# Patient Record
Sex: MALE | Race: WHITE | NOT HISPANIC OR LATINO | URBAN - METROPOLITAN AREA
[De-identification: names, ages, dates, MRNs, and addresses within clinical notes are randomized per-mention and may not be internally consistent; named-entity substitution may affect disease eponyms.]

---

## 2017-01-03 ENCOUNTER — OUTPATIENT (OUTPATIENT)
Dept: OUTPATIENT SERVICES | Facility: HOSPITAL | Age: 77
LOS: 1 days | End: 2017-01-03

## 2017-01-05 ENCOUNTER — APPOINTMENT (OUTPATIENT)
Dept: UROLOGY | Facility: CLINIC | Age: 77
End: 2017-01-05

## 2017-01-05 VITALS
BODY MASS INDEX: 42.66 KG/M2 | HEART RATE: 72 BPM | TEMPERATURE: 98.1 F | HEIGHT: 72 IN | DIASTOLIC BLOOD PRESSURE: 70 MMHG | RESPIRATION RATE: 12 BRPM | SYSTOLIC BLOOD PRESSURE: 130 MMHG | WEIGHT: 315 LBS

## 2017-01-05 DIAGNOSIS — C67.9 MALIGNANT NEOPLASM OF BLADDER, UNSPECIFIED: ICD-10-CM

## 2017-04-25 ENCOUNTER — OUTPATIENT (OUTPATIENT)
Dept: OUTPATIENT SERVICES | Facility: HOSPITAL | Age: 77
LOS: 1 days | End: 2017-04-25

## 2017-07-26 ENCOUNTER — OUTPATIENT (OUTPATIENT)
Dept: OUTPATIENT SERVICES | Facility: HOSPITAL | Age: 77
LOS: 1 days | End: 2017-07-26

## 2017-11-20 ENCOUNTER — OUTPATIENT (OUTPATIENT)
Dept: OUTPATIENT SERVICES | Facility: HOSPITAL | Age: 77
LOS: 1 days | End: 2017-11-20

## 2018-03-12 ENCOUNTER — OUTPATIENT (OUTPATIENT)
Dept: OUTPATIENT SERVICES | Facility: HOSPITAL | Age: 78
LOS: 1 days | End: 2018-03-12

## 2018-07-10 ENCOUNTER — OUTPATIENT (OUTPATIENT)
Dept: OUTPATIENT SERVICES | Facility: HOSPITAL | Age: 78
LOS: 1 days | End: 2018-07-10

## 2018-07-23 ENCOUNTER — OUTPATIENT (OUTPATIENT)
Dept: OUTPATIENT SERVICES | Facility: HOSPITAL | Age: 78
LOS: 1 days | End: 2018-07-23

## 2018-07-25 ENCOUNTER — OUTPATIENT (OUTPATIENT)
Dept: OUTPATIENT SERVICES | Facility: HOSPITAL | Age: 78
LOS: 1 days | End: 2018-07-25

## 2018-10-26 ENCOUNTER — OUTPATIENT (OUTPATIENT)
Dept: OUTPATIENT SERVICES | Facility: HOSPITAL | Age: 78
LOS: 1 days | End: 2018-10-26

## 2019-02-20 ENCOUNTER — OUTPATIENT (OUTPATIENT)
Dept: OUTPATIENT SERVICES | Facility: HOSPITAL | Age: 79
LOS: 1 days | End: 2019-02-20

## 2019-11-22 ENCOUNTER — OUTPATIENT (OUTPATIENT)
Dept: OUTPATIENT SERVICES | Facility: HOSPITAL | Age: 79
LOS: 1 days | End: 2019-11-22

## 2020-09-17 ENCOUNTER — APPOINTMENT (OUTPATIENT)
Dept: CT IMAGING | Facility: CLINIC | Age: 80
End: 2020-09-17
Payer: MEDICARE

## 2020-09-17 PROCEDURE — 74178 CT ABD&PLV WO CNTR FLWD CNTR: CPT

## 2021-04-12 ENCOUNTER — OUTPATIENT (OUTPATIENT)
Dept: OUTPATIENT SERVICES | Facility: HOSPITAL | Age: 81
LOS: 1 days | End: 2021-04-12
Payer: MEDICARE

## 2021-04-12 PROCEDURE — 74178 CT ABD&PLV WO CNTR FLWD CNTR: CPT | Mod: 26

## 2021-04-12 PROCEDURE — 78306 BONE IMAGING WHOLE BODY: CPT | Mod: 26

## 2021-05-19 ENCOUNTER — APPOINTMENT (OUTPATIENT)
Dept: RADIOLOGY | Facility: CLINIC | Age: 81
End: 2021-05-19
Payer: MEDICARE

## 2021-05-19 PROCEDURE — 71046 X-RAY EXAM CHEST 2 VIEWS: CPT

## 2021-06-03 NOTE — H&P PST ADULT - ASSESSMENT
Assessment:     ASA:   Mall:   ABR:     Problem List:   1. Unstable angina  ·	LHC and possible intervention. Consent obtained.  ·	Will start DAPT if PCI performed.  ·	IV: NS KVO  ·	Aspirin if not taken today.    2. Chronic AF  ·	Last dose of Eliquis 5/31/2021    3. DM  ·	No glimepiride or empagliflozin on day of procedure.    Discharge Planning:   ·	Discharge today if no PCI performed.  ·	Discharge in AM if PCI performed. Assessment:     ASA: 3  Mall: 3  ABR: 1.4%    Problem List:   1. Unstable angina  ·	LHC and possible intervention. Consent obtained.  ·	Will start DAPT if PCI performed.  ·	IV: NS KVO  ·	Aspirin if not taken today.    2. Chronic AF  ·	Last dose of Eliquis 5/31/2021    3. DM  ·	No glimepiride or empagliflozin on day of procedure.    Discharge Planning:   ·	Discharge today if no PCI performed.  ·	Discharge in AM if PCI performed.

## 2021-06-03 NOTE — H&P PST ADULT - HISTORY OF PRESENT ILLNESS
80y/o male former smoker with history of nonoperable CAD, S/P 2V CABG, prior PCI with stents, pacemaker, DM, HTN, HLD, chronic AF (CHADS2-Vasc of 5 for age, DM, HTN, and vascular disease),and prostate cancer who has been c/o worsening FRIEND. His diuretics were increased with no improvement. He was started on isosorbide dinitrate also with no improvement. He is unable to do a treadmill stress test and body habitus would make a false positive result more likely.    Symptoms:        Angina (Class):        Ischemic Symptoms: FRIEND    Heart Failure:        Systolic/Diastolic/Combined:        NYHA Class (within 2 weeks):     Assessment of LVEF:       EF:        Assessed by:        Date:     Prior Cardiac Interventions:       PCI's:        CABG:     Noninvasive Testing:   Stress Test: N/A    Echo: 5/7/2020       LV: EF 60% with grade 1 LVDD. Interventricular septum is mildly hypertrophied.       RA: RAP 6-10 mmHg       Mitral Valve: Mild MR       Aortic Valve: Moderate AS (CAMILLA: 1.3, peak velocity: 3.5, peak gradient: 48, mean gradient: 23) and moderate AR.       Tricuspid Valve: Normal       Pulmonic Valve: Normal       Aorta: Aortic root mildly dilated.    Antianginal Therapies:        Beta Blockers: N/A       Calcium Channel Blockers: N/A       Long Acting Nitrates: Isosorbide dinitrate 10 mg BID       Ranexa: N/A    Associated Risk Factors:        Cerebrovascular Disease: N/A       Chronic Lung Disease: COPD       Peripheral Arterial Disease: N/A       Chronic Kidney Disease (if yes, what is GFR): N/A       Uncontrolled Diabetes (if yes, what is HgbA1C or FBS): N/A       Poorly Controlled Hypertension (if yes, what is SBP): N/A       Morbid Obesity (if yes, what is BMI): N/A       History of Recent Ventricular Arrhythmia: N/A       Inability to Ambulate Safely: N/A       Need for Therapeutic Anticoagulation: Eliquis 5 mg BID       Antiplatelet or Contrast Allergy: N/A 80y/o male former smoker with history of nonoperable CAD, S/P 2V CABG, prior PCI with stents, pacemaker, DM, HTN, HLD, chronic AF (CHADS2-Vasc of 5 for age, DM, HTN, and vascular disease),and prostate cancer who has been c/o worsening FRIEND. His diuretics were increased with no improvement. He was started on isosorbide dinitrate also with no improvement. He is unable to do a treadmill stress test and body habitus would make a false positive result more likely.    Symptoms:        Angina (Class): CCSC III       Ischemic Symptoms: FRIEND      Noninvasive Testing:   Stress Test: N/A    Echo: 5/7/2020       LV: EF 60% with grade 1 LVDD. Interventricular septum is mildly hypertrophied.       RA: RAP 6-10 mmHg       Mitral Valve: Mild MR       Aortic Valve: Moderate AS (CAMILLA: 1.3, peak velocity: 3.5, peak gradient: 48, mean gradient: 23) and moderate AR.       Tricuspid Valve: Normal       Pulmonic Valve: Normal       Aorta: Aortic root mildly dilated.    Antianginal Therapies:        Beta Blockers: N/A       Calcium Channel Blockers: N/A       Long Acting Nitrates: Isosorbide dinitrate 10 mg BID       Ranexa: N/A    Associated Risk Factors:        Cerebrovascular Disease: N/A       Chronic Lung Disease: COPD       Peripheral Arterial Disease: N/A       Chronic Kidney Disease (if yes, what is GFR): N/A       Uncontrolled Diabetes (if yes, what is HgbA1C or FBS): N/A       Poorly Controlled Hypertension (if yes, what is SBP): N/A       Morbid Obesity (if yes, what is BMI): N/A       History of Recent Ventricular Arrhythmia: N/A       Inability to Ambulate Safely: N/A       Need for Therapeutic Anticoagulation: Eliquis 5 mg BID       Antiplatelet or Contrast Allergy: N/A

## 2021-06-03 NOTE — H&P PST ADULT - NSICDXPASTMEDICALHX_GEN_ALL_CORE_FT
PAST MEDICAL HISTORY:  Anemia     Anxiety     Aortic valve insufficiency, etiology of cardiac valve disease unspecified     Aortic valve stenosis, etiology of cardiac valve disease unspecified     Bladder cancer     Chronic atrial fibrillation     COPD (chronic obstructive pulmonary disease)     Coronary artery disease involving native coronary artery of native heart, unspecified whether angina     Essential hypertension     GERD (gastroesophageal reflux disease)     Hyperlipidemia, unspecified hyperlipidemia type     Type 2 diabetes mellitus without complication, without long-term current use of insulin

## 2021-06-03 NOTE — H&P PST ADULT - PRIMARY CARE PROVIDER
Krysten Parker ([address] [place] [id number], Phone: [phone], Fax: [phone])

## 2021-06-03 NOTE — H&P PST ADULT - OTHER CARE PROVIDERS
Juan José Macedo ([facility], [facility] [facility], [place], [place], [address], Phone: [phone], Fax: [phone])

## 2021-06-03 NOTE — H&P PST ADULT - NSICDXPASTSURGICALHX_GEN_ALL_CORE_FT
PAST SURGICAL HISTORY:  History of bladder surgery     History of coronary artery stent placement     History of transurethral resection of prostate     Presence of permanent cardiac pacemaker     Status post double vessel coronary artery bypass

## 2021-06-03 NOTE — H&P PST ADULT - NSICDXFAMILYHX_GEN_ALL_CORE_FT
FAMILY HISTORY:  Mother  Still living? Unknown  Family history of lung cancer, Age at diagnosis: Age Unknown    Sibling  Still living? Unknown  Family history of malignant neoplasm of brain, Age at diagnosis: Age Unknown

## 2021-06-04 ENCOUNTER — TRANSCRIPTION ENCOUNTER (OUTPATIENT)
Age: 81
End: 2021-06-04

## 2021-06-04 ENCOUNTER — INPATIENT (INPATIENT)
Facility: HOSPITAL | Age: 81
LOS: 0 days | Discharge: HOME | DRG: 287 | End: 2021-06-05
Attending: INTERNAL MEDICINE | Admitting: INTERNAL MEDICINE
Payer: COMMERCIAL

## 2021-06-04 VITALS
DIASTOLIC BLOOD PRESSURE: 85 MMHG | RESPIRATION RATE: 16 BRPM | TEMPERATURE: 98 F | HEART RATE: 65 BPM | OXYGEN SATURATION: 98 % | SYSTOLIC BLOOD PRESSURE: 141 MMHG

## 2021-06-04 DIAGNOSIS — Z98.890 OTHER SPECIFIED POSTPROCEDURAL STATES: Chronic | ICD-10-CM

## 2021-06-04 DIAGNOSIS — Z95.0 PRESENCE OF CARDIAC PACEMAKER: Chronic | ICD-10-CM

## 2021-06-04 DIAGNOSIS — Z95.5 PRESENCE OF CORONARY ANGIOPLASTY IMPLANT AND GRAFT: Chronic | ICD-10-CM

## 2021-06-04 DIAGNOSIS — Z95.1 PRESENCE OF AORTOCORONARY BYPASS GRAFT: Chronic | ICD-10-CM

## 2021-06-04 DIAGNOSIS — R06.09 OTHER FORMS OF DYSPNEA: ICD-10-CM

## 2021-06-04 LAB
ALBUMIN SERPL ELPH-MCNC: 4.1 G/DL — SIGNIFICANT CHANGE UP (ref 3.3–5.2)
ALP SERPL-CCNC: 129 U/L — HIGH (ref 40–120)
ALT FLD-CCNC: 11 U/L — SIGNIFICANT CHANGE UP
ANION GAP SERPL CALC-SCNC: 10 MMOL/L — SIGNIFICANT CHANGE UP (ref 5–17)
APTT BLD: 30 SEC — SIGNIFICANT CHANGE UP (ref 27.5–35.5)
AST SERPL-CCNC: 24 U/L — SIGNIFICANT CHANGE UP
BASOPHILS # BLD AUTO: 0.06 K/UL — SIGNIFICANT CHANGE UP (ref 0–0.2)
BASOPHILS NFR BLD AUTO: 0.9 % — SIGNIFICANT CHANGE UP (ref 0–2)
BILIRUB SERPL-MCNC: 0.5 MG/DL — SIGNIFICANT CHANGE UP (ref 0.4–2)
BUN SERPL-MCNC: 26.9 MG/DL — HIGH (ref 8–20)
CALCIUM SERPL-MCNC: 9.1 MG/DL — SIGNIFICANT CHANGE UP (ref 8.6–10.2)
CHLORIDE SERPL-SCNC: 106 MMOL/L — SIGNIFICANT CHANGE UP (ref 98–107)
CO2 SERPL-SCNC: 27 MMOL/L — SIGNIFICANT CHANGE UP (ref 22–29)
CREAT SERPL-MCNC: 1.18 MG/DL — SIGNIFICANT CHANGE UP (ref 0.5–1.3)
DACRYOCYTES BLD QL SMEAR: SLIGHT — SIGNIFICANT CHANGE UP
EOSINOPHIL # BLD AUTO: 0.22 K/UL — SIGNIFICANT CHANGE UP (ref 0–0.5)
EOSINOPHIL NFR BLD AUTO: 3.4 % — SIGNIFICANT CHANGE UP (ref 0–6)
GLUCOSE SERPL-MCNC: 67 MG/DL — LOW (ref 70–99)
HCT VFR BLD CALC: 40.8 % — SIGNIFICANT CHANGE UP (ref 39–50)
HGB BLD-MCNC: 11.2 G/DL — LOW (ref 13–17)
HYPOCHROMIA BLD QL: SLIGHT — SIGNIFICANT CHANGE UP
INR BLD: 1.15 RATIO — SIGNIFICANT CHANGE UP (ref 0.88–1.16)
LYMPHOCYTES # BLD AUTO: 1.11 K/UL — SIGNIFICANT CHANGE UP (ref 1–3.3)
LYMPHOCYTES # BLD AUTO: 17.2 % — SIGNIFICANT CHANGE UP (ref 13–44)
MAGNESIUM SERPL-MCNC: 2 MG/DL — SIGNIFICANT CHANGE UP (ref 1.6–2.6)
MANUAL SMEAR VERIFICATION: SIGNIFICANT CHANGE UP
MCHC RBC-ENTMCNC: 24.3 PG — LOW (ref 27–34)
MCHC RBC-ENTMCNC: 27.5 GM/DL — LOW (ref 32–36)
MCV RBC AUTO: 88.7 FL — SIGNIFICANT CHANGE UP (ref 80–100)
MONOCYTES # BLD AUTO: 0.61 K/UL — SIGNIFICANT CHANGE UP (ref 0–0.9)
MONOCYTES NFR BLD AUTO: 9.5 % — SIGNIFICANT CHANGE UP (ref 2–14)
NEUTROPHILS # BLD AUTO: 4.39 K/UL — SIGNIFICANT CHANGE UP (ref 1.8–7.4)
NEUTROPHILS NFR BLD AUTO: 67.2 % — SIGNIFICANT CHANGE UP (ref 43–77)
NEUTS BAND # BLD: 0.9 % — SIGNIFICANT CHANGE UP (ref 0–8)
OVALOCYTES BLD QL SMEAR: SLIGHT — SIGNIFICANT CHANGE UP
PLAT MORPH BLD: NORMAL — SIGNIFICANT CHANGE UP
PLATELET # BLD AUTO: 203 K/UL — SIGNIFICANT CHANGE UP (ref 150–400)
POIKILOCYTOSIS BLD QL AUTO: SLIGHT — SIGNIFICANT CHANGE UP
POLYCHROMASIA BLD QL SMEAR: SLIGHT — SIGNIFICANT CHANGE UP
POTASSIUM SERPL-MCNC: 4.6 MMOL/L — SIGNIFICANT CHANGE UP (ref 3.5–5.3)
POTASSIUM SERPL-SCNC: 4.6 MMOL/L — SIGNIFICANT CHANGE UP (ref 3.5–5.3)
PROT SERPL-MCNC: 8.2 G/DL — SIGNIFICANT CHANGE UP (ref 6.6–8.7)
PROTHROM AB SERPL-ACNC: 13.2 SEC — SIGNIFICANT CHANGE UP (ref 10.6–13.6)
RBC # BLD: 4.6 M/UL — SIGNIFICANT CHANGE UP (ref 4.2–5.8)
RBC # FLD: 17.2 % — HIGH (ref 10.3–14.5)
RBC BLD AUTO: ABNORMAL
SODIUM SERPL-SCNC: 142 MMOL/L — SIGNIFICANT CHANGE UP (ref 135–145)
VARIANT LYMPHS # BLD: 0.9 % — SIGNIFICANT CHANGE UP (ref 0–6)
WBC # BLD: 6.45 K/UL — SIGNIFICANT CHANGE UP (ref 3.8–10.5)
WBC # FLD AUTO: 6.45 K/UL — SIGNIFICANT CHANGE UP (ref 3.8–10.5)

## 2021-06-04 RX ORDER — SERTRALINE 25 MG/1
1 TABLET, FILM COATED ORAL
Qty: 0 | Refills: 0 | DISCHARGE

## 2021-06-04 RX ORDER — ACETAMINOPHEN 500 MG
650 TABLET ORAL EVERY 6 HOURS
Refills: 0 | Status: DISCONTINUED | OUTPATIENT
Start: 2021-06-04 | End: 2021-06-05

## 2021-06-04 RX ORDER — LEVOTHYROXINE SODIUM 125 MCG
1 TABLET ORAL
Qty: 0 | Refills: 0 | DISCHARGE

## 2021-06-04 RX ORDER — ISOSORBIDE DINITRATE 5 MG/1
10 TABLET ORAL THREE TIMES A DAY
Refills: 0 | Status: DISCONTINUED | OUTPATIENT
Start: 2021-06-04 | End: 2021-06-05

## 2021-06-04 RX ORDER — INSULIN LISPRO 100/ML
VIAL (ML) SUBCUTANEOUS
Refills: 0 | Status: DISCONTINUED | OUTPATIENT
Start: 2021-06-04 | End: 2021-06-05

## 2021-06-04 RX ORDER — DEXTROSE 50 % IN WATER 50 %
25 SYRINGE (ML) INTRAVENOUS ONCE
Refills: 0 | Status: DISCONTINUED | OUTPATIENT
Start: 2021-06-04 | End: 2021-06-05

## 2021-06-04 RX ORDER — PANTOPRAZOLE SODIUM 20 MG/1
40 TABLET, DELAYED RELEASE ORAL
Refills: 0 | Status: DISCONTINUED | OUTPATIENT
Start: 2021-06-04 | End: 2021-06-05

## 2021-06-04 RX ORDER — SODIUM CHLORIDE 9 MG/ML
1000 INJECTION, SOLUTION INTRAVENOUS
Refills: 0 | Status: DISCONTINUED | OUTPATIENT
Start: 2021-06-04 | End: 2021-06-05

## 2021-06-04 RX ORDER — APIXABAN 2.5 MG/1
1 TABLET, FILM COATED ORAL
Qty: 0 | Refills: 0 | DISCHARGE

## 2021-06-04 RX ORDER — SERTRALINE 25 MG/1
50 TABLET, FILM COATED ORAL DAILY
Refills: 0 | Status: DISCONTINUED | OUTPATIENT
Start: 2021-06-04 | End: 2021-06-05

## 2021-06-04 RX ORDER — GLUCAGON INJECTION, SOLUTION 0.5 MG/.1ML
1 INJECTION, SOLUTION SUBCUTANEOUS ONCE
Refills: 0 | Status: DISCONTINUED | OUTPATIENT
Start: 2021-06-04 | End: 2021-06-05

## 2021-06-04 RX ORDER — FUROSEMIDE 40 MG
1 TABLET ORAL
Qty: 0 | Refills: 0 | DISCHARGE

## 2021-06-04 RX ORDER — FUROSEMIDE 40 MG
40 TABLET ORAL DAILY
Refills: 0 | Status: DISCONTINUED | OUTPATIENT
Start: 2021-06-04 | End: 2021-06-05

## 2021-06-04 RX ORDER — EMPAGLIFLOZIN 10 MG/1
1 TABLET, FILM COATED ORAL
Qty: 0 | Refills: 0 | DISCHARGE

## 2021-06-04 RX ORDER — ISOSORBIDE DINITRATE 5 MG/1
0 TABLET ORAL
Qty: 0 | Refills: 0 | DISCHARGE

## 2021-06-04 RX ORDER — DEXTROSE 50 % IN WATER 50 %
15 SYRINGE (ML) INTRAVENOUS ONCE
Refills: 0 | Status: DISCONTINUED | OUTPATIENT
Start: 2021-06-04 | End: 2021-06-05

## 2021-06-04 RX ORDER — BICALUTAMIDE 50 MG/1
50 TABLET, FILM COATED ORAL DAILY
Refills: 0 | Status: DISCONTINUED | OUTPATIENT
Start: 2021-06-04 | End: 2021-06-05

## 2021-06-04 RX ORDER — GABAPENTIN 400 MG/1
0 CAPSULE ORAL
Qty: 0 | Refills: 0 | DISCHARGE

## 2021-06-04 RX ORDER — ATORVASTATIN CALCIUM 80 MG/1
1 TABLET, FILM COATED ORAL
Qty: 0 | Refills: 0 | DISCHARGE

## 2021-06-04 RX ORDER — DUTASTERIDE 0.5 MG/1
1 CAPSULE, LIQUID FILLED ORAL
Qty: 0 | Refills: 0 | DISCHARGE

## 2021-06-04 RX ORDER — BICALUTAMIDE 50 MG/1
1 TABLET, FILM COATED ORAL
Qty: 0 | Refills: 0 | DISCHARGE

## 2021-06-04 RX ORDER — ATORVASTATIN CALCIUM 80 MG/1
40 TABLET, FILM COATED ORAL AT BEDTIME
Refills: 0 | Status: DISCONTINUED | OUTPATIENT
Start: 2021-06-04 | End: 2021-06-05

## 2021-06-04 RX ORDER — ACETAMINOPHEN 500 MG
2 TABLET ORAL
Qty: 0 | Refills: 0 | DISCHARGE

## 2021-06-04 RX ORDER — PANTOPRAZOLE SODIUM 20 MG/1
1 TABLET, DELAYED RELEASE ORAL
Qty: 0 | Refills: 0 | DISCHARGE

## 2021-06-04 RX ORDER — APIXABAN 2.5 MG/1
5 TABLET, FILM COATED ORAL EVERY 12 HOURS
Refills: 0 | Status: DISCONTINUED | OUTPATIENT
Start: 2021-06-04 | End: 2021-06-05

## 2021-06-04 RX ORDER — FINASTERIDE 5 MG/1
5 TABLET, FILM COATED ORAL DAILY
Refills: 0 | Status: DISCONTINUED | OUTPATIENT
Start: 2021-06-04 | End: 2021-06-05

## 2021-06-04 RX ORDER — LEVOTHYROXINE SODIUM 125 MCG
50 TABLET ORAL DAILY
Refills: 0 | Status: DISCONTINUED | OUTPATIENT
Start: 2021-06-04 | End: 2021-06-05

## 2021-06-04 RX ORDER — SODIUM CHLORIDE 9 MG/ML
1000 INJECTION INTRAMUSCULAR; INTRAVENOUS; SUBCUTANEOUS
Refills: 0 | Status: DISCONTINUED | OUTPATIENT
Start: 2021-06-04 | End: 2021-06-05

## 2021-06-04 RX ORDER — GABAPENTIN 400 MG/1
100 CAPSULE ORAL DAILY
Refills: 0 | Status: DISCONTINUED | OUTPATIENT
Start: 2021-06-04 | End: 2021-06-05

## 2021-06-04 RX ORDER — DULAGLUTIDE 4.5 MG/.5ML
0 INJECTION, SOLUTION SUBCUTANEOUS
Qty: 0 | Refills: 0 | DISCHARGE

## 2021-06-04 RX ORDER — DEXTROSE MONOHYDRATE, SODIUM CHLORIDE, AND POTASSIUM CHLORIDE 50; .745; 4.5 G/1000ML; G/1000ML; G/1000ML
1000 INJECTION, SOLUTION INTRAVENOUS
Refills: 0 | Status: DISCONTINUED | OUTPATIENT
Start: 2021-06-04 | End: 2021-06-04

## 2021-06-04 RX ORDER — GLIMEPIRIDE 1 MG
1 TABLET ORAL
Qty: 0 | Refills: 0 | DISCHARGE

## 2021-06-04 RX ORDER — DEXTROSE 50 % IN WATER 50 %
12.5 SYRINGE (ML) INTRAVENOUS ONCE
Refills: 0 | Status: DISCONTINUED | OUTPATIENT
Start: 2021-06-04 | End: 2021-06-05

## 2021-06-04 RX ADMIN — SODIUM CHLORIDE 100 MILLILITER(S): 9 INJECTION INTRAMUSCULAR; INTRAVENOUS; SUBCUTANEOUS at 18:56

## 2021-06-04 RX ADMIN — APIXABAN 5 MILLIGRAM(S): 2.5 TABLET, FILM COATED ORAL at 22:25

## 2021-06-04 RX ADMIN — GABAPENTIN 100 MILLIGRAM(S): 400 CAPSULE ORAL at 22:25

## 2021-06-04 RX ADMIN — Medication 650 MILLIGRAM(S): at 18:47

## 2021-06-04 RX ADMIN — ISOSORBIDE DINITRATE 10 MILLIGRAM(S): 5 TABLET ORAL at 22:25

## 2021-06-04 RX ADMIN — ATORVASTATIN CALCIUM 40 MILLIGRAM(S): 80 TABLET, FILM COATED ORAL at 22:25

## 2021-06-04 RX ADMIN — BICALUTAMIDE 50 MILLIGRAM(S): 50 TABLET, FILM COATED ORAL at 22:25

## 2021-06-04 NOTE — DISCHARGE NOTE PROVIDER - NSDCCPCAREPLAN_GEN_ALL_CORE_FT
PRINCIPAL DISCHARGE DIAGNOSIS  Diagnosis: CAD (coronary artery disease)  Assessment and Plan of Treatment: You had a cardiac catheterization and did not receive any stents at this time  You will need to have your blood pressure better controlled  continue all of your medications as directed  follow up with Dr. Brown at [facility]

## 2021-06-04 NOTE — PROGRESS NOTE ADULT - SUBJECTIVE AND OBJECTIVE BOX
82y/o male former smoker with history of nonoperable CAD, S/P 2V CABG, prior PCI with stents, pacemaker, DM, HTN, HLD, chronic AF (CHADS2-Vasc of 5 for age, DM, HTN, and vascular disease),and prostate cancer who has been c/o worsening FRIEND. His diuretics were increased with no improvement. He was started on isosorbide dinitrate also with no improvement. He is unable to do a treadmill stress test and body habitus would make a false positive result more likely.    s/p R&LHC via Right groin approach with Dr. Goff  IVUS left main was negative   6Fr VIP angioseal  Anticoagulation used: 14,000 units  Contrast used: omnipaque 183ml  Admit due to uncontrolled HTN intra procedurally    Neuro: A&Ox4, KRISHNA  Pulm: CTAB  Cardiac: RRR S1S2  Vascular RFA: soft non tender no hematoma/bleeding    < from: Cardiac Cath Lab - Adult (06.04.21 @ 16:08) >  VENTRICLES: LVEF 55% 20 mm Hg gradient across the aortic valve  CORONARY VESSELS: The coronary circulation is right dominant.  LM:   --  LM: IVUS revealed non obstructive CAD of LM  --  Proximal left main: There was a 20 % stenosis.  --  Mid left main: There was a 0 % stenosis at the site of a prior stent.  LAD:   --  LAD: The vessel was small to medium sized. Angiography showed  minor luminal irregularities with no flow limiting lesions.  CX:   --  Proximal circumflex: There was a 100 % stenosis. There was good  blood supply to the distal myocardium from agraft.  RCA:   --  Proximal RCA: There was a 100 % stenosis. There was good blood  supply to the distal myocardium from a graft.  GRAFTS:   --  Graft to the 1st obtuse marginal: The graft was a saphenous  vein graft from the aorta. It was normal.  -- Graft to the RPDA: The graft was a saphenous vein graft from the aorta.  It was normal.  AORTA: Mild to moderate AI  COMPLICATIONS: There were no complications. No complications occurred  during the cath lab visit.  DIAGNOSTIC IMPRESSIONS: Prior SVGto RPDA and OM are patent. LM-LAD stent  is patent with non obstructive CAD  DIAGNOSTIC RECOMMENDATIONS: Consider CHAD (very large V wave non pwp  tracing) The patient should continue with the present medications.  INTERVENTIONAL IMPRESSIONS: Prior SVG to RPDA and OM are patent. LM-LAD  stent is patent with non obstructive CAD  INTERVENTIONAL RECOMMENDATIONS: Consider CHAD (very large V wave non pwp  tracing)  Prepared and signed by  Reginaldo Goff MD    < end of copied text >   80y/o male former smoker with history of nonoperable CAD, S/P 2V CABG, prior PCI with stents, pacemaker, DM, HTN, HLD, chronic AF (CHADS2-Vasc of 5 for age, DM, HTN, and vascular disease),and prostate cancer who has been c/o worsening FRIEND. His diuretics were increased with no improvement. He was started on isosorbide dinitrate also with no improvement. He is unable to do a treadmill stress test and body habitus would make a false positive result more likely.    s/p R&LHC via Right groin approach with Dr. Goff  IVUS left main was negative   6Fr VIP angioseal  Anticoagulation used: 14,000 units  Contrast used: omnipaque 183ml  Admit due to uncontrolled HTN intra procedurally    Neuro: A&Ox4, KRISHNA  Pulm: CTAB  Cardiac: RRR S1S2  Vascular RFA: soft non tender no hematoma/bleeding    < from: Cardiac Cath Lab - Adult (06.04.21 @ 16:08) >  VENTRICLES: LVEF 55% 20 mm Hg gradient across the aortic valve  CORONARY VESSELS: The coronary circulation is right dominant.  LM:   --  LM: IVUS revealed non obstructive CAD of LM  --  Proximal left main: There was a 20 % stenosis.  --  Mid left main: There was a 0 % stenosis at the site of a prior stent.  LAD:   --  LAD: The vessel was small to medium sized. Angiography showed  minor luminal irregularities with no flow limiting lesions.  CX:   --  Proximal circumflex: There was a 100 % stenosis. There was good  blood supply to the distal myocardium from agraft.  RCA:   --  Proximal RCA: There was a 100 % stenosis. There was good blood  supply to the distal myocardium from a graft.  GRAFTS:   --  Graft to the 1st obtuse marginal: The graft was a saphenous  vein graft from the aorta. It was normal.  -- Graft to the RPDA: The graft was a saphenous vein graft from the aorta.  It was normal.  AORTA: Mild to moderate AI  COMPLICATIONS: There were no complications. No complications occurred  during the cath lab visit.  DIAGNOSTIC IMPRESSIONS: Prior SVGto RPDA and OM are patent. LM-LAD stent  is patent with non obstructive CAD  DIAGNOSTIC RECOMMENDATIONS: Consider CHAD (very large V wave non pwp  tracing) The patient should continue with the present medications.  INTERVENTIONAL IMPRESSIONS: Prior SVG to RPDA and OM are patent. LM-LAD  stent is patent with non obstructive CAD  INTERVENTIONAL RECOMMENDATIONS: Consider CHAD (very large V wave non pwp  tracing)  Prepared and signed by  Reginaldo Goff MD      Post cath orders  Labs in AM  bedrest x3 hours post sheath removal  DC in aM  No change to medications at this time  Follow up with Dr. Brown at [facility]    82y/o male former smoker with history of nonoperable CAD, S/P 2V CABG, prior PCI with stents, pacemaker, DM, HTN, HLD, chronic AF (CHADS2-Vasc of 5 for age, DM, HTN, and vascular disease),and prostate cancer who has been c/o worsening FRIEND. His diuretics were increased with no improvement. He was started on isosorbide dinitrate also with no improvement. He is unable to do a treadmill stress test and body habitus would make a false positive result more likely.    s/p R&LHC via Right groin approach with Dr. Goff  IVUS left main was negative   Prior SVGto RPDA and OM are patent. LM-LAD stent is patent with non obstructive CAD  6Fr VIP angioseal  Anticoagulation used: 14,000 units  Contrast used: omnipaque 183ml  Admit due to uncontrolled HTN intra procedurally    Neuro: A&Ox4, KRISHNA  Pulm: CTAB  Cardiac: RRR S1S2  Vascular RFA: soft non tender no hematoma/bleeding    < from: Cardiac Cath Lab - Adult (06.04.21 @ 16:08) >  VENTRICLES: LVEF 55% 20 mm Hg gradient across the aortic valve  CORONARY VESSELS: The coronary circulation is right dominant.  LM:   --  LM: IVUS revealed non obstructive CAD of LM  --  Proximal left main: There was a 20 % stenosis.  --  Mid left main: There was a 0 % stenosis at the site of a prior stent.  LAD:   --  LAD: The vessel was small to medium sized. Angiography showed  minor luminal irregularities with no flow limiting lesions.  CX:   --  Proximal circumflex: There was a 100 % stenosis. There was good  blood supply to the distal myocardium from agraft.  RCA:   --  Proximal RCA: There was a 100 % stenosis. There was good blood  supply to the distal myocardium from a graft.  GRAFTS:   --  Graft to the 1st obtuse marginal: The graft was a saphenous  vein graft from the aorta. It was normal.  -- Graft to the RPDA: The graft was a saphenous vein graft from the aorta.  It was normal.  AORTA: Mild to moderate AI  COMPLICATIONS: There were no complications. No complications occurred  during the cath lab visit.  DIAGNOSTIC IMPRESSIONS: Prior SVGto RPDA and OM are patent. LM-LAD stent  is patent with non obstructive CAD  DIAGNOSTIC RECOMMENDATIONS: Consider CHAD (very large V wave non pwp  tracing) The patient should continue with the present medications.  INTERVENTIONAL IMPRESSIONS: Prior SVG to RPDA and OM are patent. LM-LAD  stent is patent with non obstructive CAD  INTERVENTIONAL RECOMMENDATIONS: Consider CHAD (very large V wave non pwp  tracing)  Prepared and signed by  Reginaldo Goff MD      Post cath orders  Labs in AM  bedrest x3 hours post sheath removal  DC in aM  No change to medications at this time  Follow up with Dr. Brown at [facility]

## 2021-06-04 NOTE — ASU PATIENT PROFILE, ADULT - PSH
History of bladder surgery    History of coronary artery stent placement    History of transurethral resection of prostate    Presence of permanent cardiac pacemaker    Status post double vessel coronary artery bypass

## 2021-06-04 NOTE — DISCHARGE NOTE PROVIDER - HOSPITAL COURSE
82y/o male former smoker with history of nonoperable CAD, S/P 2V CABG, prior PCI with stents, pacemaker, DM, HTN, HLD, chronic AF (CHADS2-Vasc of 5 for age, DM, HTN, and vascular disease),and prostate cancer who has been c/o worsening FRIEND. His diuretics were increased with no improvement. He was started on isosorbide dinitrate also with no improvement. He is unable to do a treadmill stress test and body habitus would make a false positive result more likely.    s/p R&LHC via Right groin approach with Dr. Goff  IVUS left main was negative   6Fr VIP angioseal  Anticoagulation used: 14,000 units  Contrast used: omnipaque 183ml  Admit due to uncontrolled HTN intra procedurally 80y/o male former smoker with history of nonoperable CAD, S/P 2V CABG, prior PCI with stents, pacemaker, DM, HTN, HLD, chronic AF (CHADS2-Vasc of 5 for age, DM, HTN, and vascular disease),and prostate cancer who has been c/o worsening FRIEND. His diuretics were increased with no improvement. He was started on isosorbide dinitrate also with no improvement. He is unable to do a treadmill stress test and body habitus would make a false positive result more likely.    s/p R&LHC via Right groin approach with Dr. Goff  IVUS left main was negative   6Fr VIP angioseal  Anticoagulation used: 14,000 units  Contrast used: omnipaque 183ml  Admit due to uncontrolled HTN intra procedurally  Desaturated overnight with activity and required O2.  Given additional diuretic and was weaned off O2 without difficulty  Ambulating and tolerating diet  Stable for discharge home

## 2021-06-04 NOTE — DISCHARGE NOTE PROVIDER - NSDCMRMEDTOKEN_GEN_ALL_CORE_FT
atorvastatin 40 mg oral tablet: 1 tab(s) orally once a day  bicalutamide 50 mg oral tablet: 1 tab(s) orally every 24 hours  dutasteride 0.5 mg oral capsule: 1 cap(s) orally once a day  Eliquis 5 mg oral tablet: 1 tab(s) orally 2 times a day  furosemide 20 mg oral tablet: 2 tab(s) orally once a day, As Needed  gabapentin 100 mg oral capsule: orally once a day  glimepiride 4 mg oral tablet: 1 tab(s) orally once a day  isosorbide dinitrate 10 mg oral tablet, chewable: orally 2 times a day  Jardiance 25 mg oral tablet: 1 tab(s) orally once a day (in the morning)  levothyroxine 50 mcg (0.05 mg) oral tablet: 1 tab(s) orally once a day  pantoprazole 40 mg oral delayed release tablet: 1 tab(s) orally once a day  sertraline 50 mg oral tablet: 1 tab(s) orally once a day  Trulicity Pen 1.5 mg/0.5 mL subcutaneous solution:   Tylenol 500 mg oral tablet: 2  orally 2 times a day   atorvastatin 40 mg oral tablet: 1 tab(s) orally once a day  bicalutamide 50 mg oral tablet: 1 tab(s) orally every 24 hours  dutasteride 0.5 mg oral capsule: 1 cap(s) orally once a day  Eliquis 5 mg oral tablet: 1 tab(s) orally 2 times a day  furosemide 40 mg oral tablet: 1 tab(s) orally once a day  gabapentin 100 mg oral capsule: orally once a day  glimepiride 4 mg oral tablet: 1 tab(s) orally once a day  isosorbide dinitrate 10 mg oral tablet, chewable: orally 2 times a day  Jardiance 25 mg oral tablet: 1 tab(s) orally once a day (in the morning)  levothyroxine 50 mcg (0.05 mg) oral tablet: 1 tab(s) orally once a day  pantoprazole 40 mg oral delayed release tablet: 1 tab(s) orally once a day  sertraline 50 mg oral tablet: 1 tab(s) orally once a day  Trulicity Pen 1.5 mg/0.5 mL subcutaneous solution:   Tylenol 500 mg oral tablet: 2  orally 2 times a day

## 2021-06-04 NOTE — ASU PATIENT PROFILE, ADULT - PMH
Anemia    Anxiety    Aortic valve insufficiency, etiology of cardiac valve disease unspecified    Aortic valve stenosis, etiology of cardiac valve disease unspecified    Bladder cancer    Chronic atrial fibrillation    COPD (chronic obstructive pulmonary disease)    Coronary artery disease involving native coronary artery of native heart, unspecified whether angina    Essential hypertension    GERD (gastroesophageal reflux disease)    Hyperlipidemia, unspecified hyperlipidemia type    Type 2 diabetes mellitus without complication, without long-term current use of insulin

## 2021-06-04 NOTE — DISCHARGE NOTE PROVIDER - PROVIDER TOKENS
FREE:[LAST:[Dr. Brown],PHONE:[(   )    -],FAX:[(   )    -],ADDRESS:[[facility]]]

## 2021-06-04 NOTE — DISCHARGE NOTE PROVIDER - NSDCCPTREATMENT_GEN_ALL_CORE_FT
PRINCIPAL PROCEDURE  Procedure: Combined right and left heart cardiac catheterization  Findings and Treatment: No heavy lifting, driving, sex, tub baths, swimming, or any activity that submerges the lower half of the body in water for 48 hours.  Limited walking and stairs for 48 hours.    Change the bandaid after 24 hours and every 24 hours after that.  Keep the puncture site dry and covered with a bandaid until a scab forms.    Observe the site frequently.  If bleeding or a large lump (the size of a golf ball or bigger) occurs lie flat, apply continuous direct pressure just above the puncture site for at least 10 minutes, and notify your physician immediately.  If the bleeding cannot be controlled, call 911 immediately for assistance.  Notify your physician of pain, swelling or any drainage.    Notify your physician immediately if coldness, numbness, discoloration or pain in your foot occurs.

## 2021-06-04 NOTE — DISCHARGE NOTE PROVIDER - CARE PROVIDER_API CALL
Dr. Brown,   [facility]  Phone: (   )    -  Fax: (   )    -  Follow Up Time:

## 2021-06-04 NOTE — DISCHARGE NOTE PROVIDER - NSDCFUADDAPPT_GEN_ALL_CORE_FT
Follow up with your Cardiologist Dr. Brown at [facility]  Follow up with your Cardiologist Dr. Brown at [facility]   Follow up with Dr Poole from pulmonology

## 2021-06-04 NOTE — ASU PATIENT PROFILE, ADULT - BRADEN SCORE (IF 18 OR LESS ACTIVATE SKIN INJURY RISK INCREASED GUIDELINE), MLM
MS RN CLOSING NOTES



PATIENT IN BED WATCHING TV AT THIS TIME. A/O X 4. ABLE TO MAKE NEEDS KNOWN. TOLERATING  ROOM 
AIR WITH NO SOB NOTED DURING SHIFT. IV ACCESS ON LFA G#22 INTACT AND PATENT, IVF OF NS 
@75ML/HR INFUSING WELL, NO S/S OF INFILTRATION AT SITE NOTED. ALL NEEDS AND CARE ATTENDED 
WELL. SAFETY MEASURES IN PLACE: BED LOCKED AND IN LOWEST POSITION, SIDE RAILS UPX2 AND CALL 
LIGHT WITHIN REACH. 16

## 2021-06-05 ENCOUNTER — TRANSCRIPTION ENCOUNTER (OUTPATIENT)
Age: 81
End: 2021-06-05

## 2021-06-05 VITALS
DIASTOLIC BLOOD PRESSURE: 66 MMHG | HEART RATE: 77 BPM | SYSTOLIC BLOOD PRESSURE: 125 MMHG | RESPIRATION RATE: 18 BRPM | OXYGEN SATURATION: 93 %

## 2021-06-05 LAB
A1C WITH ESTIMATED AVERAGE GLUCOSE RESULT: 6.2 % — HIGH (ref 4–5.6)
ANION GAP SERPL CALC-SCNC: 8 MMOL/L — SIGNIFICANT CHANGE UP (ref 5–17)
BUN SERPL-MCNC: 23.6 MG/DL — HIGH (ref 8–20)
CALCIUM SERPL-MCNC: 8.7 MG/DL — SIGNIFICANT CHANGE UP (ref 8.6–10.2)
CHLORIDE SERPL-SCNC: 104 MMOL/L — SIGNIFICANT CHANGE UP (ref 98–107)
CO2 SERPL-SCNC: 29 MMOL/L — SIGNIFICANT CHANGE UP (ref 22–29)
COVID-19 SPIKE DOMAIN AB INTERP: POSITIVE
COVID-19 SPIKE DOMAIN ANTIBODY RESULT: 196 U/ML — HIGH
CREAT SERPL-MCNC: 1.14 MG/DL — SIGNIFICANT CHANGE UP (ref 0.5–1.3)
ESTIMATED AVERAGE GLUCOSE: 131 MG/DL — HIGH (ref 68–114)
GLUCOSE SERPL-MCNC: 109 MG/DL — HIGH (ref 70–99)
HCT VFR BLD CALC: 36.6 % — LOW (ref 39–50)
HGB BLD-MCNC: 10.2 G/DL — LOW (ref 13–17)
MAGNESIUM SERPL-MCNC: 2.1 MG/DL — SIGNIFICANT CHANGE UP (ref 1.6–2.6)
MCHC RBC-ENTMCNC: 24.6 PG — LOW (ref 27–34)
MCHC RBC-ENTMCNC: 27.9 GM/DL — LOW (ref 32–36)
MCV RBC AUTO: 88.2 FL — SIGNIFICANT CHANGE UP (ref 80–100)
PLATELET # BLD AUTO: 182 K/UL — SIGNIFICANT CHANGE UP (ref 150–400)
POTASSIUM SERPL-MCNC: 4.5 MMOL/L — SIGNIFICANT CHANGE UP (ref 3.5–5.3)
POTASSIUM SERPL-SCNC: 4.5 MMOL/L — SIGNIFICANT CHANGE UP (ref 3.5–5.3)
RBC # BLD: 4.15 M/UL — LOW (ref 4.2–5.8)
RBC # FLD: 17.4 % — HIGH (ref 10.3–14.5)
SARS-COV-2 IGG+IGM SERPL QL IA: 196 U/ML — HIGH
SARS-COV-2 IGG+IGM SERPL QL IA: POSITIVE
SODIUM SERPL-SCNC: 141 MMOL/L — SIGNIFICANT CHANGE UP (ref 135–145)
WBC # BLD: 5.7 K/UL — SIGNIFICANT CHANGE UP (ref 3.8–10.5)
WBC # FLD AUTO: 5.7 K/UL — SIGNIFICANT CHANGE UP (ref 3.8–10.5)

## 2021-06-05 PROCEDURE — C1760: CPT

## 2021-06-05 PROCEDURE — C1889: CPT

## 2021-06-05 PROCEDURE — 86769 SARS-COV-2 COVID-19 ANTIBODY: CPT

## 2021-06-05 PROCEDURE — 83735 ASSAY OF MAGNESIUM: CPT

## 2021-06-05 PROCEDURE — 93461 R&L HRT ART/VENTRICLE ANGIO: CPT

## 2021-06-05 PROCEDURE — C1769: CPT

## 2021-06-05 PROCEDURE — C1753: CPT

## 2021-06-05 PROCEDURE — 83036 HEMOGLOBIN GLYCOSYLATED A1C: CPT

## 2021-06-05 PROCEDURE — C1887: CPT

## 2021-06-05 PROCEDURE — 93005 ELECTROCARDIOGRAM TRACING: CPT

## 2021-06-05 PROCEDURE — 99153 MOD SED SAME PHYS/QHP EA: CPT

## 2021-06-05 PROCEDURE — 82962 GLUCOSE BLOOD TEST: CPT

## 2021-06-05 PROCEDURE — 36415 COLL VENOUS BLD VENIPUNCTURE: CPT

## 2021-06-05 PROCEDURE — 85025 COMPLETE CBC W/AUTO DIFF WBC: CPT

## 2021-06-05 PROCEDURE — 80053 COMPREHEN METABOLIC PANEL: CPT

## 2021-06-05 PROCEDURE — 85610 PROTHROMBIN TIME: CPT

## 2021-06-05 PROCEDURE — 93567 NJX CAR CTH SPRVLV AORTGRPHY: CPT

## 2021-06-05 PROCEDURE — 85730 THROMBOPLASTIN TIME PARTIAL: CPT

## 2021-06-05 PROCEDURE — 80048 BASIC METABOLIC PNL TOTAL CA: CPT

## 2021-06-05 PROCEDURE — 85027 COMPLETE CBC AUTOMATED: CPT

## 2021-06-05 PROCEDURE — 99152 MOD SED SAME PHYS/QHP 5/>YRS: CPT

## 2021-06-05 PROCEDURE — 92978 ENDOLUMINL IVUS OCT C 1ST: CPT

## 2021-06-05 PROCEDURE — C1894: CPT

## 2021-06-05 RX ORDER — FUROSEMIDE 40 MG
2 TABLET ORAL
Qty: 0 | Refills: 0 | DISCHARGE

## 2021-06-05 RX ORDER — FUROSEMIDE 40 MG
1 TABLET ORAL
Qty: 30 | Refills: 1
Start: 2021-06-05

## 2021-06-05 RX ORDER — FUROSEMIDE 40 MG
40 TABLET ORAL ONCE
Refills: 0 | Status: COMPLETED | OUTPATIENT
Start: 2021-06-05 | End: 2021-06-05

## 2021-06-05 RX ADMIN — Medication 50 MICROGRAM(S): at 05:24

## 2021-06-05 RX ADMIN — Medication 650 MILLIGRAM(S): at 08:02

## 2021-06-05 RX ADMIN — APIXABAN 5 MILLIGRAM(S): 2.5 TABLET, FILM COATED ORAL at 08:03

## 2021-06-05 RX ADMIN — Medication 650 MILLIGRAM(S): at 02:25

## 2021-06-05 RX ADMIN — Medication 650 MILLIGRAM(S): at 01:25

## 2021-06-05 RX ADMIN — ISOSORBIDE DINITRATE 10 MILLIGRAM(S): 5 TABLET ORAL at 05:23

## 2021-06-05 RX ADMIN — PANTOPRAZOLE SODIUM 40 MILLIGRAM(S): 20 TABLET, DELAYED RELEASE ORAL at 05:23

## 2021-06-05 RX ADMIN — Medication 40 MILLIGRAM(S): at 05:23

## 2021-06-05 RX ADMIN — Medication 40 MILLIGRAM(S): at 09:57

## 2021-06-05 NOTE — DISCHARGE NOTE NURSING/CASE MANAGEMENT/SOCIAL WORK - NSDCFUADDAPPT_GEN_ALL_CORE_FT
Follow up with your Cardiologist Dr. Brown at [facility]   Follow up with Dr Poole from pulmonology

## 2021-06-05 NOTE — DISCHARGE NOTE NURSING/CASE MANAGEMENT/SOCIAL WORK - PATIENT PORTAL LINK FT
You can access the FollowMyHealth Patient Portal offered by [facility] by registering at the following website: [email or web address]. By joining [facility]’s FollowMyHealth portal, you will also be able to view your health information using other applications (apps) compatible with our system.

## 2021-06-05 NOTE — PROGRESS NOTE ADULT - ASSESSMENT
82y/o male former smoker with history of nonoperable CAD, S/P 2V CABG, prior PCI with stents, pacemaker, DM, HTN, HLD, chronic AF (CHADS2-Vasc of 5 for age, DM, HTN, and vascular disease),and prostate cancer who has been c/o worsening FRIEND. His diuretics were increased with no improvement. He was started on isosorbide dinitrate also with no improvement. He is unable to do a treadmill stress test and body habitus would make a false positive result more likely.    s/p R&LHC via Right groin approach with Dr. Goff  IVUS left main was negative   Prior SVGto RPDA and OM are patent. LM-LAD stent is patent with non obstructive CAD    Admitted due to uncontrolled HTN intra procedurally  Medical management  Overnight pt noted with low O2 sats requiring supplemental O2    Cont Eliquis  Groin precautions reviewed  Aditional Lasix IV given this am  Wean O2 as tolerated  Call to [facility] re: further management  Cont home meds including Imdur, Statin  Possible discharge home later today 82y/o male former smoker with history of nonoperable CAD, S/P 2V CABG, prior PCI with stents, pacemaker, DM, HTN, HLD, chronic AF (CHADS2-Vasc of 5 for age, DM, HTN, and vascular disease),and prostate cancer who has been c/o worsening FRIEND. His diuretics were increased with no improvement. He was started on isosorbide dinitrate also with no improvement. He is unable to do a treadmill stress test and body habitus would make a false positive result more likely.    s/p R&LHC via Right groin approach with Dr. Goff  IVUS left main was negative   Prior SVGto RPDA and OM are patent. LM-LAD stent is patent with non obstructive CAD    Admitted due to uncontrolled HTN intra procedurally  Medical management  Overnight pt noted with low O2 sats requiring supplemental O2    Cont Eliquis  Groin precautions reviewed  Additional Lasix IV given this am  Wean O2 as tolerated  Call to [facility] re: further management  Cont home meds including Imdur, Statin  Possible discharge home later today    ADDENDUM:  Pt resonded to additional diuretic with excellent output  O2 weaned off and maintaining sat >90 at rest. Conts to have desats -88% with exertion asymptomatic  Will dc home and pt will follow up with Dr Poole his pulmonologist and Dr Solis

## 2021-06-05 NOTE — PROGRESS NOTE ADULT - SUBJECTIVE AND OBJECTIVE BOX
82y/o male former smoker with history of nonoperable CAD, S/P 2V CABG, prior PCI with stents, pacemaker, DM, HTN, HLD, chronic AF (CHADS2-Vasc of 5 for age, DM, HTN, and vascular disease),and prostate cancer who has been c/o worsening FRIEND. His diuretics were increased with no improvement. He was started on isosorbide dinitrate also with no improvement. He is unable to do a treadmill stress test and body habitus would make a false positive result more likely.    s/p R&LHC via Right groin approach with Dr. Goff  IVUS left main was negative   Prior SVGto RPDA and OM are patent. LM-LAD stent is patent with non obstructive CAD    Admitted due to uncontrolled HTN intra procedurally  Medical management  Overnight pt noted with low O2 sats requiring supplemental O2  Pt denies sob BUT DOES ADMIT TO DYSPNEA WALKING TO BATHROOM. hE REPORTS THIS IS UNCHANGED FROM PRIOR TO PROCEDURE  pT ALSO HAS PULMONOLOGIST THAT HE IS FOLLOWING WITH AND HAS UPCOMING APPOINTMENT. hE DOES NOT USE HOME o2    MEDICATIONS  (STANDING):  apixaban 5 milliGRAM(s) Oral every 12 hours  atorvastatin 40 milliGRAM(s) Oral at bedtime  bicalutamide 50 milliGRAM(s) Oral daily  dextrose 40% Gel 15 Gram(s) Oral once  dextrose 5%. 1000 milliLiter(s) (50 mL/Hr) IV Continuous <Continuous>  dextrose 5%. 1000 milliLiter(s) (100 mL/Hr) IV Continuous <Continuous>  dextrose 50% Injectable 25 Gram(s) IV Push once  dextrose 50% Injectable 12.5 Gram(s) IV Push once  dextrose 50% Injectable 25 Gram(s) IV Push once  finasteride 5 milliGRAM(s) Oral daily  furosemide    Tablet 40 milliGRAM(s) Oral daily  gabapentin 100 milliGRAM(s) Oral daily  glucagon  Injectable 1 milliGRAM(s) IntraMuscular once  insulin lispro (ADMELOG) corrective regimen sliding scale   SubCutaneous three times a day before meals  isosorbide   dinitrate Tablet (ISORDIL) 10 milliGRAM(s) Oral three times a day  levothyroxine 50 MICROGram(s) Oral daily  pantoprazole    Tablet 40 milliGRAM(s) Oral before breakfast  sertraline 50 milliGRAM(s) Oral daily  sodium chloride 0.9%. 1000 milliLiter(s) (100 mL/Hr) IV Continuous <Continuous>    MEDICATIONS  (PRN):  acetaminophen   Tablet .. 650 milliGRAM(s) Oral every 6 hours PRN Temp greater or equal to 38C (100.4F), Mild Pain (1 - 3)      Allergies    penicillin (Hives)    Intolerances      PAST MEDICAL & SURGICAL HISTORY:  Coronary artery disease involving native coronary artery of native heart, unspecified whether angina    Type 2 diabetes mellitus without complication, without long-term current use of insulin    Essential hypertension    Hyperlipidemia, unspecified hyperlipidemia type    COPD (chronic obstructive pulmonary disease)    GERD (gastroesophageal reflux disease)    Bladder cancer    Chronic atrial fibrillation    Anemia    Anxiety    Aortic valve stenosis, etiology of cardiac valve disease unspecified    Aortic valve insufficiency, etiology of cardiac valve disease unspecified    History of bladder surgery    Status post double vessel coronary artery bypass    History of transurethral resection of prostate    Presence of permanent cardiac pacemaker    History of coronary artery stent placement        Vital Signs Last 24 Hrs  T(C): 36.5 (05 Jun 2021 05:12), Max: 36.7 (04 Jun 2021 15:24)  T(F): 97.7 (05 Jun 2021 05:12), Max: 98 (04 Jun 2021 15:24)  HR: 74 (05 Jun 2021 05:12) (65 - 83)  BP: 132/65 (05 Jun 2021 05:12) (116/54 - 177/73)  BP(mean): --  RR: 19 (05 Jun 2021 05:12) (14 - 19)  SpO2: 88% (05 Jun 2021 05:12) (88% - 100%)    Physical Exam:  Constitutional: NAD, AAOx3  Cardiovascular: +S1S2 2/6 ISABELL  Pulmonary: diminished at bases with fine L base crackles  GI: soft NTND +BS  Extremities: R groin without hematoma, mild ecchymosis. 1+edema, +distal pulses b/l  Neuro: non focal, KRISHNA x4    LABS:                        10.2   5.70  )-----------( 182      ( 05 Jun 2021 06:18 )             36.6     06-05    141  |  104  |  23.6<H>  ----------------------------<  109<H>  4.5   |  29.0  |  1.14    Ca    8.7      05 Jun 2021 06:18  Mg     2.1     06-05    TPro  8.2  /  Alb  4.1  /  TBili  0.5  /  DBili  x   /  AST  24  /  ALT  11  /  AlkPhos  129<H>  06-04    PT/INR - ( 04 Jun 2021 14:57 )   PT: 13.2 sec;   INR: 1.15 ratio         PTT - ( 04 Jun 2021 14:57 )  PTT:30.0 sec      RADIOLOGY & ADDITIONAL TESTS:

## 2021-06-15 LAB
GLUCOSE BLDC GLUCOMTR-MCNC: 108 MG/DL — HIGH (ref 70–99)
GLUCOSE BLDC GLUCOMTR-MCNC: 121 MG/DL — HIGH (ref 70–99)

## 2021-07-22 ENCOUNTER — FORM ENCOUNTER (OUTPATIENT)
Age: 81
End: 2021-07-22

## 2021-07-27 ENCOUNTER — FORM ENCOUNTER (OUTPATIENT)
Age: 81
End: 2021-07-27

## 2021-08-17 ENCOUNTER — FORM ENCOUNTER (OUTPATIENT)
Age: 81
End: 2021-08-17

## 2021-08-19 ENCOUNTER — FORM ENCOUNTER (OUTPATIENT)
Age: 81
End: 2021-08-19

## 2021-09-16 ENCOUNTER — FORM ENCOUNTER (OUTPATIENT)
Age: 81
End: 2021-09-16

## 2021-09-27 ENCOUNTER — FORM ENCOUNTER (OUTPATIENT)
Age: 81
End: 2021-09-27

## 2021-12-13 ENCOUNTER — OUTPATIENT (OUTPATIENT)
Dept: OUTPATIENT SERVICES | Facility: HOSPITAL | Age: 81
LOS: 1 days | End: 2021-12-13

## 2021-12-13 DIAGNOSIS — Z95.0 PRESENCE OF CARDIAC PACEMAKER: Chronic | ICD-10-CM

## 2021-12-13 DIAGNOSIS — Z98.890 OTHER SPECIFIED POSTPROCEDURAL STATES: Chronic | ICD-10-CM

## 2021-12-13 DIAGNOSIS — Z95.5 PRESENCE OF CORONARY ANGIOPLASTY IMPLANT AND GRAFT: Chronic | ICD-10-CM

## 2021-12-13 DIAGNOSIS — Z95.1 PRESENCE OF AORTOCORONARY BYPASS GRAFT: Chronic | ICD-10-CM

## 2021-12-16 ENCOUNTER — FORM ENCOUNTER (OUTPATIENT)
Age: 81
End: 2021-12-16

## 2021-12-29 ENCOUNTER — APPOINTMENT (OUTPATIENT)
Dept: RADIOLOGY | Facility: CLINIC | Age: 81
End: 2021-12-29
Payer: MEDICARE

## 2021-12-29 PROBLEM — F41.9 ANXIETY DISORDER, UNSPECIFIED: Chronic | Status: ACTIVE | Noted: 2021-06-03

## 2021-12-29 PROBLEM — K21.9 GASTRO-ESOPHAGEAL REFLUX DISEASE WITHOUT ESOPHAGITIS: Chronic | Status: ACTIVE | Noted: 2021-06-03

## 2021-12-29 PROBLEM — D64.9 ANEMIA, UNSPECIFIED: Chronic | Status: ACTIVE | Noted: 2021-06-03

## 2021-12-29 PROBLEM — I10 ESSENTIAL (PRIMARY) HYPERTENSION: Chronic | Status: ACTIVE | Noted: 2021-06-03

## 2021-12-29 PROBLEM — I25.10 ATHEROSCLEROTIC HEART DISEASE OF NATIVE CORONARY ARTERY WITHOUT ANGINA PECTORIS: Chronic | Status: ACTIVE | Noted: 2021-06-03

## 2021-12-29 PROBLEM — I35.1 NONRHEUMATIC AORTIC (VALVE) INSUFFICIENCY: Chronic | Status: ACTIVE | Noted: 2021-06-03

## 2021-12-29 PROBLEM — J44.9 CHRONIC OBSTRUCTIVE PULMONARY DISEASE, UNSPECIFIED: Chronic | Status: ACTIVE | Noted: 2021-06-03

## 2021-12-29 PROBLEM — C67.9 MALIGNANT NEOPLASM OF BLADDER, UNSPECIFIED: Chronic | Status: ACTIVE | Noted: 2021-06-03

## 2021-12-29 PROBLEM — E78.5 HYPERLIPIDEMIA, UNSPECIFIED: Chronic | Status: ACTIVE | Noted: 2021-06-03

## 2021-12-29 PROBLEM — I35.0 NONRHEUMATIC AORTIC (VALVE) STENOSIS: Chronic | Status: ACTIVE | Noted: 2021-06-03

## 2021-12-29 PROBLEM — E11.9 TYPE 2 DIABETES MELLITUS WITHOUT COMPLICATIONS: Chronic | Status: ACTIVE | Noted: 2021-06-03

## 2021-12-29 PROBLEM — I48.20 CHRONIC ATRIAL FIBRILLATION, UNSPECIFIED: Chronic | Status: ACTIVE | Noted: 2021-06-03

## 2021-12-29 PROCEDURE — 71046 X-RAY EXAM CHEST 2 VIEWS: CPT

## 2022-03-07 ENCOUNTER — FORM ENCOUNTER (OUTPATIENT)
Age: 82
End: 2022-03-07

## 2022-03-08 ENCOUNTER — FORM ENCOUNTER (OUTPATIENT)
Age: 82
End: 2022-03-08

## 2022-03-09 ENCOUNTER — OUTPATIENT (OUTPATIENT)
Dept: OUTPATIENT SERVICES | Facility: HOSPITAL | Age: 82
LOS: 1 days | End: 2022-03-09
Payer: MEDICARE

## 2022-03-09 DIAGNOSIS — L97.422 NON-PRESSURE CHRONIC ULCER OF LEFT HEEL AND MIDFOOT WITH FAT LAYER EXPOSED: ICD-10-CM

## 2022-03-09 DIAGNOSIS — Z95.1 PRESENCE OF AORTOCORONARY BYPASS GRAFT: Chronic | ICD-10-CM

## 2022-03-09 DIAGNOSIS — Z98.890 OTHER SPECIFIED POSTPROCEDURAL STATES: Chronic | ICD-10-CM

## 2022-03-09 DIAGNOSIS — Z95.0 PRESENCE OF CARDIAC PACEMAKER: Chronic | ICD-10-CM

## 2022-03-09 DIAGNOSIS — Z95.5 PRESENCE OF CORONARY ANGIOPLASTY IMPLANT AND GRAFT: Chronic | ICD-10-CM

## 2022-03-09 PROCEDURE — 78315 BONE IMAGING 3 PHASE: CPT | Mod: 26

## 2022-03-17 ENCOUNTER — FORM ENCOUNTER (OUTPATIENT)
Age: 82
End: 2022-03-17

## 2022-04-07 ENCOUNTER — FORM ENCOUNTER (OUTPATIENT)
Age: 82
End: 2022-04-07

## 2022-04-21 ENCOUNTER — FORM ENCOUNTER (OUTPATIENT)
Age: 82
End: 2022-04-21

## 2022-05-12 ENCOUNTER — FORM ENCOUNTER (OUTPATIENT)
Age: 82
End: 2022-05-12

## 2022-06-23 ENCOUNTER — FORM ENCOUNTER (OUTPATIENT)
Age: 82
End: 2022-06-23

## 2022-07-04 ENCOUNTER — OUTPATIENT (OUTPATIENT)
Dept: OUTPATIENT SERVICES | Facility: HOSPITAL | Age: 82
LOS: 1 days | End: 2022-07-04

## 2022-07-04 DIAGNOSIS — R35.0 FREQUENCY OF MICTURITION: ICD-10-CM

## 2022-07-04 DIAGNOSIS — Z95.0 PRESENCE OF CARDIAC PACEMAKER: Chronic | ICD-10-CM

## 2022-07-04 DIAGNOSIS — Z98.890 OTHER SPECIFIED POSTPROCEDURAL STATES: Chronic | ICD-10-CM

## 2022-07-04 DIAGNOSIS — Z95.5 PRESENCE OF CORONARY ANGIOPLASTY IMPLANT AND GRAFT: Chronic | ICD-10-CM

## 2022-07-04 DIAGNOSIS — Z95.1 PRESENCE OF AORTOCORONARY BYPASS GRAFT: Chronic | ICD-10-CM

## 2022-07-20 ENCOUNTER — NON-APPOINTMENT (OUTPATIENT)
Age: 82
End: 2022-07-20

## 2022-07-20 DIAGNOSIS — Z80.1 FAMILY HISTORY OF MALIGNANT NEOPLASM OF TRACHEA, BRONCHUS AND LUNG: ICD-10-CM

## 2022-07-20 DIAGNOSIS — Z85.46 PERSONAL HISTORY OF MALIGNANT NEOPLASM OF PROSTATE: ICD-10-CM

## 2022-07-20 DIAGNOSIS — Z82.49 FAMILY HISTORY OF ISCHEMIC HEART DISEASE AND OTHER DISEASES OF THE CIRCULATORY SYSTEM: ICD-10-CM

## 2022-07-20 DIAGNOSIS — Z80.0 FAMILY HISTORY OF MALIGNANT NEOPLASM OF DIGESTIVE ORGANS: ICD-10-CM

## 2022-07-20 DIAGNOSIS — L03.116 CELLULITIS OF LEFT LOWER LIMB: ICD-10-CM

## 2022-07-20 DIAGNOSIS — L97.319 VARICOSE VEINS OF RIGHT LOWER EXTREMITY WITH ULCER OF ANKLE: ICD-10-CM

## 2022-07-20 DIAGNOSIS — I83.013 VARICOSE VEINS OF RIGHT LOWER EXTREMITY WITH ULCER OF ANKLE: ICD-10-CM

## 2022-07-20 RX ORDER — FUROSEMIDE 20 MG/1
20 TABLET ORAL
Refills: 0 | Status: ACTIVE | COMMUNITY

## 2022-07-20 RX ORDER — ACETAMINOPHEN 325 MG/1
TABLET, FILM COATED ORAL
Refills: 0 | Status: ACTIVE | COMMUNITY

## 2022-07-20 RX ORDER — DOXYCYCLINE HYCLATE 100 MG/1
100 CAPSULE ORAL
Refills: 0 | Status: ACTIVE | COMMUNITY

## 2022-07-20 RX ORDER — ATORVASTATIN CALCIUM 40 MG/1
40 TABLET, FILM COATED ORAL
Refills: 0 | Status: ACTIVE | COMMUNITY

## 2022-07-20 RX ORDER — SULFAMETHOXAZOLE AND TRIMETHOPRIM 800; 160 MG/1; MG/1
800-160 TABLET ORAL
Refills: 0 | Status: ACTIVE | COMMUNITY

## 2022-07-20 RX ORDER — BICALUTAMIDE 50 MG/1
50 TABLET ORAL
Refills: 0 | Status: ACTIVE | COMMUNITY

## 2022-07-20 RX ORDER — DUTASTERIDE 0.5 MG/1
0.5 CAPSULE, LIQUID FILLED ORAL
Refills: 0 | Status: ACTIVE | COMMUNITY

## 2022-08-05 ENCOUNTER — APPOINTMENT (OUTPATIENT)
Dept: VASCULAR SURGERY | Facility: CLINIC | Age: 82
End: 2022-08-05

## 2022-08-23 ENCOUNTER — RX RENEWAL (OUTPATIENT)
Age: 82
End: 2022-08-23

## 2022-10-10 NOTE — ASU PATIENT PROFILE, ADULT - MENTAL HEALTH CONDITIONS/SYMPTOMS, PROFILE
anxiety disorder Infliximab Counseling:  I discussed with the patient the risks of infliximab including but not limited to myelosuppression, immunosuppression, autoimmune hepatitis, demyelinating diseases, lymphoma, and serious infections.  The patient understands that monitoring is required including a PPD at baseline and must alert us or the primary physician if symptoms of infection or other concerning signs are noted.

## 2022-10-17 ENCOUNTER — NON-APPOINTMENT (OUTPATIENT)
Age: 82
End: 2022-10-17

## 2022-10-17 DIAGNOSIS — I48.91 UNSPECIFIED ATRIAL FIBRILLATION: ICD-10-CM

## 2022-10-17 DIAGNOSIS — Z85.51 PERSONAL HISTORY OF MALIGNANT NEOPLASM OF BLADDER: ICD-10-CM

## 2022-10-17 DIAGNOSIS — Z83.3 FAMILY HISTORY OF DIABETES MELLITUS: ICD-10-CM

## 2022-11-14 ENCOUNTER — APPOINTMENT (OUTPATIENT)
Dept: ENDOCRINOLOGY | Facility: CLINIC | Age: 82
End: 2022-11-14

## 2022-11-14 VITALS
DIASTOLIC BLOOD PRESSURE: 58 MMHG | TEMPERATURE: 97.5 F | WEIGHT: 287.38 LBS | HEART RATE: 77 BPM | SYSTOLIC BLOOD PRESSURE: 108 MMHG | BODY MASS INDEX: 38.92 KG/M2 | OXYGEN SATURATION: 99 % | HEIGHT: 72 IN

## 2022-11-14 PROCEDURE — 99213 OFFICE O/P EST LOW 20 MIN: CPT

## 2022-11-14 NOTE — PHYSICAL EXAM
[Alert] : alert [Obese] : obese [No Acute Distress] : no acute distress [Normal Sclera/Conjunctiva] : normal sclera/conjunctiva [No Neck Mass] : no neck mass was observed [No Accessory Muscle Use] : no accessory muscle use [Clear to Auscultation] : lungs were clear to auscultation bilaterally [Normal S1, S2] : normal S1 and S2 [Normal Rate] : heart rate was normal [Carotids Normal] : carotid pulses were normal with no bruits [Normal Bowel Sounds] : normal bowel sounds [Not Tender] : non-tender [Not Distended] : not distended [Soft] : abdomen soft [Normal Supraclavicular Nodes] : no supraclavicular lymphadenopathy [No CVA Tenderness] : no ~M costovertebral angle tenderness [No Stigmata of Cushings Syndrome] : no stigmata of Cushings Syndrome [No Tremors] : no tremors [Oriented x3] : oriented to person, place, and time [de-identified] : Positive for systolic murmur grade 2 over the left sternal border [de-identified] : Mild pitting edema over both of the lower extremities distal pulses are diminished and there is chronic venous stasis [de-identified] : Deferred [FreeTextEntry1] : Deferred [de-identified] : Deferred [de-identified] : Bilateral erythema of the lower extremities more pronounced on the left side.  He has a small ulcer over the left heel which is dry with without any drainage. [de-identified] : Decreased sensation for pinprick over both of the lower extremities

## 2022-11-14 NOTE — HISTORY OF PRESENT ILLNESS
[FreeTextEntry1] : This is a an 82-year-old white male with a past medical history of type 2 diabetes coronary artery disease presents for routine evaluation.  Patient is currently taking glimepiride 4 mg tablets once a day, Jardiance 25 mg daily Trulicity 1.5 mg every week and levothyroxine 75 mcg daily.  Denies any significant symptoms except that he become short of breath very easily on ambulation.  He needs to walk with a cane.  He feels quite unsteady on on his feet.  There is no history of any recent falls.  He does have numbness and tingling of his lower extremities and also weight loss from 292 to 86 pounds.  He denies any abdominal pain nausea vomiting but he also has mild burning of the urine and and dribbling sensation especially at night.  With a history of a prostate cancer for which he was treated with radiation.  His fingersticks at home fluctuate between 80 to 120 mg per DL.

## 2022-11-14 NOTE — ASSESSMENT
[FreeTextEntry1] : Elderly white male with a past medical history of type 2 diabetes currently maintained on glimepiride Jardiance and Trulicity.  His most recent lab test from 11/9/2022 showed hemoglobin A1c 6.5% LDL of 43 complete metabolic panel is normal his GFR is 59.  He has a chronic ulcer on the left heel which is slowly healing and the for this the patient is being followed by a podiatrist.  Patient is fairly well compliant with his medication and diet and as a result of the medication he is able to lose weight.  He is tolerating the Trulicity and Jardiance and no significant episodes of hypoglycemia.  Recommendation\par 1.  Patient will continue with the diabetic medications which include glimepiride 4 mg once a day Jardiance 25 mg daily and Trulicity 1.5 mg every week.\par 2.  The importance of diet and ambulation was discussed with the patient.  Fall precautions were also explained and the need to ambulate with the help of cane or a walker was explained to the patient.\par 3.  Patient will follow up with his cardiologist and also of the ophthalmologist.  He has regular appointments with the podiatrist.\par 4.  Plan discussed with the patient.  He will return to the office in approximately 3 months time with a prior blood test. [Diabetes Foot Care] : diabetes foot care [Long Term Vascular Complications] : long term vascular complications of diabetes [Carbohydrate Consistent Diet] : carbohydrate consistent diet [Importance of Diet and Exercise] : importance of diet and exercise to improve glycemic control, achieve weight loss and improve cardiovascular health [Exercise/Effect on Glucose] : exercise/effect on glucose [Hypoglycemia Management] : hypoglycemia management

## 2022-11-14 NOTE — REVIEW OF SYSTEMS
[Recent Weight Gain (___ Lbs)] : recent weight gain: [unfilled] lbs [Muscle Weakness] : muscle weakness [Joint Stiffness] : joint stiffness [Dry Skin] : dry skin [Ulcer] : ulcer [Dizziness] : dizziness [Difficulty Walking] : difficulty walking [Pain/Numbness of Digits] : pain/numbness of digits [Negative] : Endocrine [de-identified] : Left heel ulcer

## 2022-11-28 DIAGNOSIS — Z91.81 HISTORY OF FALLING: ICD-10-CM

## 2022-11-28 DIAGNOSIS — L97.429 TYPE 2 DIABETES MELLITUS WITH FOOT ULCER: ICD-10-CM

## 2022-11-28 DIAGNOSIS — R26.2 DIFFICULTY IN WALKING, NOT ELSEWHERE CLASSIFIED: ICD-10-CM

## 2022-11-28 DIAGNOSIS — E11.621 TYPE 2 DIABETES MELLITUS WITH FOOT ULCER: ICD-10-CM

## 2022-11-28 DIAGNOSIS — Z74.09 OTHER REDUCED MOBILITY: ICD-10-CM

## 2022-12-12 ENCOUNTER — NON-APPOINTMENT (OUTPATIENT)
Age: 82
End: 2022-12-12

## 2023-02-27 ENCOUNTER — APPOINTMENT (OUTPATIENT)
Dept: ENDOCRINOLOGY | Facility: CLINIC | Age: 83
End: 2023-02-27
Payer: MEDICARE

## 2023-02-27 VITALS
TEMPERATURE: 97.1 F | OXYGEN SATURATION: 95 % | DIASTOLIC BLOOD PRESSURE: 56 MMHG | BODY MASS INDEX: 37.52 KG/M2 | SYSTOLIC BLOOD PRESSURE: 114 MMHG | HEIGHT: 72 IN | HEART RATE: 99 BPM | WEIGHT: 277 LBS

## 2023-02-27 DIAGNOSIS — L97.422 NON-PRESSURE CHRONIC ULCER OF LEFT HEEL AND MIDFOOT WITH FAT LAYER EXPOSED: ICD-10-CM

## 2023-02-27 DIAGNOSIS — R60.0 LOCALIZED EDEMA: ICD-10-CM

## 2023-02-27 PROCEDURE — 99213 OFFICE O/P EST LOW 20 MIN: CPT

## 2023-02-27 NOTE — HISTORY OF PRESENT ILLNESS
[FreeTextEntry1] : 83-year-old white male with a past medical history of type 2 diabetes currently on Trulicity 1.5 mg every week, Jardiance 25 mg daily and glimepiride 4 mg tablets daily.  Patient denies any significant symptoms except that he has been slightly depressed due to the fact that his spouse is in in a rehab unit.  His appetite is good and he his eating habits are fair.  He does become short short of breath especially when he ambulates and he needs to walk with 2 canes.  He denies any symptoms of hypoglycemia.  He does complain of numbness and tingling of the lower extremities with swelling of the ankles.  He also complains of intermittent lightheadedness when he stands up suddenly.  He has no new he has no nausea vomiting dysuria shortness of breath or chest pains.

## 2023-02-27 NOTE — ASSESSMENT
[FreeTextEntry1] : Elderly white male with a past medical history of a type 2 diabetes which is complicated with peripheral neuropathy patient complains of an unstable gait and he uses 2 canes for ambulation.  He is also being followed by a podiatrist for chronic left heel ulcer which is healing well.  He is also fairly well compliant with his diet and has not gained any significant weight since his last visit.  He denies any symptoms of hypoglycemia or any dysuria.  Recommendation\par 1.  We will obtain the results of his last blood test which were performed a few days ago.\par 2.  Patient will continue with his current med medications which include Trulicity 1.5 mg every week, Jardiance 25 mg daily and glimepiride 4 mg tablet once a day.\par 3.  The importance of diet and weight loss were discussed with the patient, fall precautions were also discussed.  Patient is to follow-up with his podiatrist.,\par 4.  Patient will return to the office in approximately 3 to 4 months with a repeat blood test prior to his office visit.  Thank you

## 2023-02-27 NOTE — PHYSICAL EXAM
[Well Nourished] : well nourished [Normal Sclera/Conjunctiva] : normal sclera/conjunctiva [EOMI] : extra ocular movement intact [Normal Hearing] : hearing was normal [No Neck Mass] : no neck mass was observed [Thyroid Not Enlarged] : the thyroid was not enlarged [No Respiratory Distress] : no respiratory distress [Clear to Auscultation] : lungs were clear to auscultation bilaterally [Normal S1, S2] : normal S1 and S2 [Normal Rate] : heart rate was normal [Regular Rhythm] : with a regular rhythm [Not Tender] : non-tender [Soft] : abdomen soft [No HSM] : no hepato-splenomegaly [Normal Supraclavicular Nodes] : no supraclavicular lymphadenopathy [Normal Anterior Cervical Nodes] : no anterior cervical lymphadenopathy [Normal Posterior Cervical Nodes] : no posterior cervical lymphadenopathy [No CVA Tenderness] : no ~M costovertebral angle tenderness [No Spinal Tenderness] : no spinal tenderness [No Rash] : no rash [No Skin Lesions] : no skin lesions [Foot Ulcers] : no foot ulcers [Normal Reflexes] : deep tendon reflexes were 2+ and symmetric [Oriented x3] : oriented to person, place, and time [Normal Insight/Judgement] : insight and judgment were intact [de-identified] : Ambulating with 2 canes [de-identified] : Soft systolic murmur over the aortic area diminished distal pulses [de-identified] : Diminished distal pulses, mild bilateral pitting edema [de-identified] : Deferred [FreeTextEntry1] : Deferred [de-identified] : Deferred [de-identified] : Bilateral ankle swelling dryness of the skin [de-identified] : Poor balance [de-identified] : Decreased sensation over  lower extremities

## 2023-06-19 ENCOUNTER — APPOINTMENT (OUTPATIENT)
Dept: ENDOCRINOLOGY | Facility: CLINIC | Age: 83
End: 2023-06-19
Payer: MEDICARE

## 2023-06-19 VITALS
HEIGHT: 72 IN | TEMPERATURE: 97.1 F | SYSTOLIC BLOOD PRESSURE: 100 MMHG | OXYGEN SATURATION: 95 % | WEIGHT: 274.5 LBS | HEART RATE: 87 BPM | BODY MASS INDEX: 37.18 KG/M2 | DIASTOLIC BLOOD PRESSURE: 60 MMHG | RESPIRATION RATE: 12 BRPM

## 2023-06-19 DIAGNOSIS — R63.4 ABNORMAL WEIGHT LOSS: ICD-10-CM

## 2023-06-19 DIAGNOSIS — D50.0 IRON DEFICIENCY ANEMIA SECONDARY TO BLOOD LOSS (CHRONIC): ICD-10-CM

## 2023-06-19 PROCEDURE — 99213 OFFICE O/P EST LOW 20 MIN: CPT

## 2023-06-19 NOTE — REVIEW OF SYSTEMS
[Recent Weight Loss (___ Lbs)] : recent weight loss: [unfilled] lbs [Abdominal Pain] : abdominal pain [Polyuria] : no polyuria [Dysuria] : no dysuria [Nocturia] : nocturia [Incontinence] : no incontinence [Dizziness] : no dizziness [Difficulty Walking] : difficulty walking [Tremors] : no tremors [Pain/Numbness of Digits] : pain/numbness of digits [Poor Balance] : poor balance [Depression] : depression [Insomnia] : insomnia [Anxiety] : anxiety [Negative] : Heme/Lymph [FreeTextEntry7] : Mild left upper quadrant tenderness [de-identified] : Significant weight loss and anemia

## 2023-06-19 NOTE — HISTORY OF PRESENT ILLNESS
[FreeTextEntry1] : 3-year-old white male with a past medical history of for type 2 diabetes who is currently taking Trulicity 1.5 mg subcutaneously every week, glimepiride 4 mg tablet daily and Jardiance 25 mg daily.  Patient now presents for follow-up.  He claimed that his sugar levels have been ranging between 58 to 124 mg per DL.  His appetite is good but his caloric intake has reduced significantly.  He also reports that he lost weight from 340 pounds down to 274 pounds.  He recently was diagnosed to have anemia which has been resistanT to iron infusions.  His work-up is still in progress.  Patient receives his meals through the program of [employer].  He denies any chest pain shortness of breath but he is getting occasional episodes of hypoglycemia.  He also has some numbness of extremities but denies any chest pain shortness of breath nausea vomiting abdominal pain or diarrhea.  He also becomes short of breath when he ambulates and he needs to walk with 2 canes.  A past medical history significant for atrial fibrillation, coronary artery disease, bladder cancer, obesity, morbid obesity.

## 2023-06-19 NOTE — ASSESSMENT
[Diabetes Foot Care] : diabetes foot care [Long Term Vascular Complications] : long term vascular complications of diabetes [Carbohydrate Consistent Diet] : carbohydrate consistent diet [Importance of Diet and Exercise] : importance of diet and exercise to improve glycemic control, achieve weight loss and improve cardiovascular health [Exercise/Effect on Glucose] : exercise/effect on glucose [Hypoglycemia Management] : hypoglycemia management [Self Monitoring of Blood Glucose] : self monitoring of blood glucose [Retinopathy Screening] : Patient was referred to ophthalmology for retinopathy screening [FreeTextEntry1] : Elderly white gentleman who has a past medical history of a type II.diabetes which until recently has been stable and well-controlled.  Patient recently had a blood test on May 23, 2023 which showed a TSH of 2.26 complete metabolic panel was normal except for BUN of 42 creatinine 1.39 alkaline phosphatase was elevated 122 hemoglobin A1c of 6.6% hemoglobin 8.2% hematocrit of 30.3.  Most recent blood test from 6/14/2023 show a BUN of 27.7 creatinine 1.48 fasting glucose was 60 mg per DL liver function test were normal.  Clinical impression is that the this is a pleasant white male who has had history of a stable type 2 diabetes presents with a main complaint of poor appetite, weight loss with episodes of mild hypoglycemia.  His diabetic control remains stable however the patient is on Trulicity and glimepiride which can also cause hypoglycemic episodes.  The cause of the anemia is not clear which is in the process of being worked up..  Recommendation\par 1.  I have advised the patient to discontinue the Trulicity and also I will lower the glimepiride from 4 to 2 mg tablets every day and he will continue with the Jardiance 25 mg tablets daily.\par 2.  Patient is advised to eat frequent and smaller meal portions and to take adequate amount of carbohydrates in the meal with a bedtime snack\par 3.  Patient to continue to monitor his sugar levels at least twice a day and he will call me in approximately 1 week with his glucose readings.  The plan was discussed in detail with the patient.  Thank you

## 2023-07-12 ENCOUNTER — TRANSCRIPTION ENCOUNTER (OUTPATIENT)
Age: 83
End: 2023-07-12

## 2023-08-22 ENCOUNTER — APPOINTMENT (OUTPATIENT)
Dept: ENDOCRINOLOGY | Facility: CLINIC | Age: 83
End: 2023-08-22
Payer: MEDICARE

## 2023-08-22 VITALS
SYSTOLIC BLOOD PRESSURE: 104 MMHG | HEART RATE: 78 BPM | HEIGHT: 72 IN | BODY MASS INDEX: 37.25 KG/M2 | DIASTOLIC BLOOD PRESSURE: 58 MMHG | WEIGHT: 275 LBS | RESPIRATION RATE: 16 BRPM | TEMPERATURE: 97.5 F | OXYGEN SATURATION: 95 %

## 2023-08-22 DIAGNOSIS — E66.01 MORBID (SEVERE) OBESITY DUE TO EXCESS CALORIES: ICD-10-CM

## 2023-08-22 DIAGNOSIS — I83.893 VARICOSE VEINS OF BILATERAL LOWER EXTREMITIES WITH OTHER COMPLICATIONS: ICD-10-CM

## 2023-08-22 PROCEDURE — 99213 OFFICE O/P EST LOW 20 MIN: CPT

## 2023-08-22 NOTE — HISTORY OF PRESENT ILLNESS
[FreeTextEntry1] : 83-year-old white male with a past medical history of type 2 diabetes and who is currently taking Jardiance 25 mg tablet daily, glimepiride 2 mg tablets daily.  Patient is off the Trulicity.  Currently the diva patient is feeling much better.  His energy level has improved.  He was diagnosed to have iron deficiency anemia for which she was given iron infusions.  His fingersticks in the morning are between 90 to 140 mg per DL.  He does have mild polyuria and polydipsia but he denies any dysuria.  His weight has been stable.  And his vision also has not changed.  His past history significant for coronary artery disease, bladder cancer, atrial fibrillation, iron deficiency anemia, peripheral venous insufficiency and varicose veins.

## 2023-08-22 NOTE — PHYSICAL EXAM
[Alert] : alert [No Acute Distress] : no acute distress [Normal Sclera/Conjunctiva] : normal sclera/conjunctiva [PERRL] : pupils equal, round and reactive to light [Normal Outer Ear/Nose] : the ears and nose were normal in appearance [Normal Hearing] : hearing was normal [No Neck Mass] : no neck mass was observed [No LAD] : no lymphadenopathy [Thyroid Not Enlarged] : the thyroid was not enlarged [No Respiratory Distress] : no respiratory distress [No Accessory Muscle Use] : no accessory muscle use [Clear to Auscultation] : lungs were clear to auscultation bilaterally [Normal S1, S2] : normal S1 and S2 [Normal Rate] : heart rate was normal [Regular Rhythm] : with a regular rhythm [Carotids Normal] : carotid pulses were normal with no bruits [Not Tender] : non-tender [Soft] : abdomen soft [No HSM] : no hepato-splenomegaly [Normal Supraclavicular Nodes] : no supraclavicular lymphadenopathy [Normal Anterior Cervical Nodes] : no anterior cervical lymphadenopathy [Normal Posterior Cervical Nodes] : no posterior cervical lymphadenopathy [No CVA Tenderness] : no ~M costovertebral angle tenderness [No Clubbing, Cyanosis] : no clubbing  or cyanosis of the fingernails [No Rash] : no rash [No Skin Lesions] : no skin lesions [No Motor Deficits] : the motor exam was normal [Normal Reflexes] : deep tendon reflexes were 2+ and symmetric [No Tremors] : no tremors [Oriented x3] : oriented to person, place, and time [Normal Insight/Judgement] : insight and judgment were intact [de-identified] : He is ambulating with 2 canes [de-identified] : Distal pulses are diminished bilaterally.  He has chronic venous stasis of the lower extremities [de-identified] : Deferred [FreeTextEntry1] : Deferred [de-identified] : Deferred [de-identified] : Stable weight [de-identified] : Poor gait

## 2023-08-22 NOTE — ASSESSMENT
[Diabetes Foot Care] : diabetes foot care [Long Term Vascular Complications] : long term vascular complications of diabetes [Carbohydrate Consistent Diet] : carbohydrate consistent diet [Importance of Diet and Exercise] : importance of diet and exercise to improve glycemic control, achieve weight loss and improve cardiovascular health [Exercise/Effect on Glucose] : exercise/effect on glucose [Self Monitoring of Blood Glucose] : self monitoring of blood glucose [Retinopathy Screening] : Patient was referred to ophthalmology for retinopathy screening [FreeTextEntry1] : Elderly white male with a past medical history of for type 2 diabetes complicated with venous stasis.  He also has underlying coronary artery disease and atrial fibrillation which is in remission and chronic cellulitis of the lower extremities.  Patient recently had a blood work done and his urine analysis showed that it was positive for bacteria and significant amount of white blood cells.  His complete metabolic panel is normal BUN is 29 creatinine 1.27 his hemoglobin A1c is 6.9%.  Patient is with a stable glycemic control.  He is off the Trulicity and his appetite has improved and he is not losing any more weight.  Recommendation I have advised the patient to continue with the glimepiride 2 mg tablets daily together with the Jardiance 25 mg tablets daily. 2.  Patient will require antibiotic treatment for his underlying urinary tract infection.  I have advised him to take ciprofloxacin to 50 mg tablets twice a day and he was made aware of the side effects including hypoglycemia. 3.  The importance of diet and weight control were discussed with the patient.  His physical activity is significantly limited. 4.  If the patient remains stable he will follow-up in approximately 4 months time.  Thank you

## 2023-11-20 LAB — HBA1C MFR BLD HPLC: 6.9

## 2024-01-09 ENCOUNTER — APPOINTMENT (OUTPATIENT)
Dept: ENDOCRINOLOGY | Facility: CLINIC | Age: 84
End: 2024-01-09
Payer: MEDICARE

## 2024-01-09 VITALS
SYSTOLIC BLOOD PRESSURE: 100 MMHG | DIASTOLIC BLOOD PRESSURE: 58 MMHG | TEMPERATURE: 97.8 F | HEIGHT: 72 IN | HEART RATE: 79 BPM | WEIGHT: 269 LBS | BODY MASS INDEX: 36.44 KG/M2 | OXYGEN SATURATION: 96 % | RESPIRATION RATE: 16 BRPM

## 2024-01-09 DIAGNOSIS — I87.2 VENOUS INSUFFICIENCY (CHRONIC) (PERIPHERAL): ICD-10-CM

## 2024-01-09 DIAGNOSIS — E78.1 PURE HYPERGLYCERIDEMIA: ICD-10-CM

## 2024-01-09 PROCEDURE — 99214 OFFICE O/P EST MOD 30 MIN: CPT

## 2024-01-09 RX ORDER — CIPROFLOXACIN HYDROCHLORIDE 250 MG/1
250 TABLET, FILM COATED ORAL
Qty: 14 | Refills: 1 | Status: DISCONTINUED | COMMUNITY
Start: 2023-08-22 | End: 2024-01-09

## 2024-01-09 RX ORDER — ISOSORBIDE DINITRATE 10 MG/1
10 TABLET ORAL
Refills: 0 | Status: DISCONTINUED | COMMUNITY
End: 2024-01-09

## 2024-02-11 ENCOUNTER — RX RENEWAL (OUTPATIENT)
Age: 84
End: 2024-02-11

## 2024-02-11 RX ORDER — EMPAGLIFLOZIN 25 MG/1
25 TABLET, FILM COATED ORAL
Qty: 90 | Refills: 2 | Status: ACTIVE | COMMUNITY
Start: 2024-02-11 | End: 1900-01-01

## 2024-04-06 RX ORDER — GABAPENTIN 100 MG/1
100 CAPSULE ORAL DAILY
Qty: 90 | Refills: 2 | Status: ACTIVE | COMMUNITY
Start: 1900-01-01 | End: 1900-01-01

## 2024-05-14 ENCOUNTER — APPOINTMENT (OUTPATIENT)
Dept: ENDOCRINOLOGY | Facility: CLINIC | Age: 84
End: 2024-05-14
Payer: MEDICARE

## 2024-05-14 VITALS
DIASTOLIC BLOOD PRESSURE: 60 MMHG | RESPIRATION RATE: 16 BRPM | BODY MASS INDEX: 36.57 KG/M2 | HEART RATE: 90 BPM | WEIGHT: 270 LBS | TEMPERATURE: 97.9 F | OXYGEN SATURATION: 93 % | HEIGHT: 72 IN | SYSTOLIC BLOOD PRESSURE: 98 MMHG

## 2024-05-14 DIAGNOSIS — E03.9 HYPOTHYROIDISM, UNSPECIFIED: ICD-10-CM

## 2024-05-14 DIAGNOSIS — I25.10 ATHEROSCLEROTIC HEART DISEASE OF NATIVE CORONARY ARTERY W/OUT ANGINA PECTORIS: ICD-10-CM

## 2024-05-14 DIAGNOSIS — E11.9 TYPE 2 DIABETES MELLITUS W/OUT COMPLICATIONS: ICD-10-CM

## 2024-05-14 DIAGNOSIS — E11.49 TYPE 2 DIABETES MELLITUS WITH OTHER DIABETIC NEUROLOGICAL COMPLICATION: ICD-10-CM

## 2024-05-14 DIAGNOSIS — N39.0 URINARY TRACT INFECTION, SITE NOT SPECIFIED: ICD-10-CM

## 2024-05-14 PROCEDURE — 99214 OFFICE O/P EST MOD 30 MIN: CPT

## 2024-05-14 PROCEDURE — G2211 COMPLEX E/M VISIT ADD ON: CPT

## 2024-05-14 RX ORDER — LEVOTHYROXINE SODIUM 0.05 MG/1
50 TABLET ORAL
Qty: 90 | Refills: 3 | Status: ACTIVE | COMMUNITY
Start: 2024-05-14 | End: 1900-01-01

## 2024-05-14 RX ORDER — DULAGLUTIDE 1.5 MG/.5ML
1.5 INJECTION, SOLUTION SUBCUTANEOUS
Refills: 0 | Status: DISCONTINUED | COMMUNITY
End: 2024-05-14

## 2024-05-14 RX ORDER — GLIMEPIRIDE 2 MG/1
2 TABLET ORAL DAILY
Qty: 90 | Refills: 3 | Status: DISCONTINUED | COMMUNITY
Start: 2023-08-22 | End: 2024-05-14

## 2024-05-14 RX ORDER — GLIMEPIRIDE 4 MG/1
4 TABLET ORAL TWICE DAILY
Qty: 180 | Refills: 2 | Status: ACTIVE | COMMUNITY
Start: 2024-05-14 | End: 1900-01-01

## 2024-05-14 RX ORDER — CIPROFLOXACIN HYDROCHLORIDE 500 MG/1
500 TABLET, FILM COATED ORAL TWICE DAILY
Qty: 14 | Refills: 0 | Status: ACTIVE | COMMUNITY
Start: 2024-05-14 | End: 1900-01-01

## 2024-05-14 NOTE — PHYSICAL EXAM
[Alert] : alert [Well Nourished] : well nourished [No Acute Distress] : no acute distress [Well Developed] : well developed [Normal Sclera/Conjunctiva] : normal sclera/conjunctiva [EOMI] : extra ocular movement intact [No Proptosis] : no proptosis [Normal Oropharynx] : the oropharynx was normal [Thyroid Not Enlarged] : the thyroid was not enlarged [No Thyroid Nodules] : no palpable thyroid nodules [No Respiratory Distress] : no respiratory distress [No Accessory Muscle Use] : no accessory muscle use [Normal S1, S2] : normal S1 and S2 [Clear to Auscultation] : lungs were clear to auscultation bilaterally [Normal Rate] : heart rate was normal [Regular Rhythm] : with a regular rhythm [Carotids Normal] : carotid pulses were normal with no bruits [No Edema] : no peripheral edema [Normal Bowel Sounds] : normal bowel sounds [Not Tender] : non-tender [Not Distended] : not distended [Soft] : abdomen soft [No HSM] : no hepato-splenomegaly [Normal Anterior Cervical Nodes] : no anterior cervical lymphadenopathy [Normal Posterior Cervical Nodes] : no posterior cervical lymphadenopathy [No Spinal Tenderness] : no spinal tenderness [Spine Straight] : spine straight [No Stigmata of Cushings Syndrome] : no stigmata of Cushings Syndrome [No Rash] : no rash [Abdominal Striae] : no abdominal striae [Acanthosis Nigricans] : no acanthosis nigricans [Foot Ulcers] : no foot ulcers [No Motor Deficits] : the motor exam was normal [No Sensory Deficits] : the sensory exam was normal to light touch and pinprick [Normal Reflexes] : deep tendon reflexes were 2+ and symmetric [No Tremors] : no tremors [Oriented x3] : oriented to person, place, and time [de-identified] : Systolic murmur over the aortic area [de-identified] : Patient is ambulating with the help of 2 canes he has significant proximal weakness [de-identified] : Diminished pedal pulses [de-identified] : Significant polyuria and polydipsia.  No change in his weight [de-identified] : Patient with a poor gait and needs the help of canes [de-identified] : Dry skin over the lower extremities

## 2024-05-14 NOTE — REVIEW OF SYSTEMS
[Fatigue] : no fatigue [Decreased Appetite] : appetite not decreased [Recent Weight Gain (___ Lbs)] : no recent weight gain [Recent Weight Loss (___ Lbs)] : no recent weight loss [Polyuria] : polyuria [Dysuria] : dysuria [Nocturia] : nocturia [Hesistancy] : hesitancy [Muscle Weakness] : muscle weakness [Joint Stiffness] : joint stiffness [Dizziness] : dizziness [Difficulty Walking] : difficulty walking [Pain/Numbness of Digits] : pain/numbness of digits [Poor Balance] : poor balance [Polydipsia] : polydipsia [Negative] : Heme/Lymph

## 2024-05-14 NOTE — HISTORY OF PRESENT ILLNESS
[FreeTextEntry1] : I this is an elderly white male who has a past medical history of type 2 diabetes and primary hypothyroidism.  The patient is currently taking glimepiride 2 mg twice a day, Jardiance 25 mg daily, Invokana 100 mg daily and levothyroxine 50 mcg tablet daily.  Patient claimed that he is not very well compliant with his diet.  His wife is currently sick and is at the local nursing home.  Patient also claimed that he his weight loss has stopped and he is now down to close to 275 pounds.  He denies any chest pains or shortness of breath or headache and no episodes of hypoglycemia.  His fingersticks usually in the morning are between 120 to 150 mg per DL and he has mild polyuria and mild polydipsia.  His vision has been stable but he has the numbness of his extremities.  Also he reports that his balance is poor as of chronic pain and he needs to walk with 2 canes.  His past medical history significant for bladder cancer, coronary artery disease, cellulitis of the left leg, ulcers of the left heel, elevated lipids, obesity, placement of a pacemaker.

## 2024-05-14 NOTE — ASSESSMENT
[Diabetes Foot Care] : diabetes foot care [Long Term Vascular Complications] : long term vascular complications of diabetes [Carbohydrate Consistent Diet] : carbohydrate consistent diet [Importance of Diet and Exercise] : importance of diet and exercise to improve glycemic control, achieve weight loss and improve cardiovascular health [Exercise/Effect on Glucose] : exercise/effect on glucose [Hypoglycemia Management] : hypoglycemia management [Retinopathy Screening] : Patient was referred to ophthalmology for retinopathy screening [FreeTextEntry1] : Elderly white female who has a history of a type 2 diabetes complicated with severe neuropathy and also primary hypothyroidism.  Patient recently had blood test performed which showed that the hemoglobin A1c is 7.8, complete metabolic panel is normal, the urine shows the presence of infection and also the albumin/creatinine ratio is high at 222.  His LDL is 106 mg per DL.  Patient appears to have uncontrolled type 2 diabetes most likely secondary to poor compliance with diet and lack of exercise.  Clinically he is euthyroid but he also has urinary tract infection recommendation 1.  I have advised the patient to start insulin but he is against it and he rather prefer with oral pills.  I have increased the patient's glimepiride to 4 mg tablets twice a day and to continue with the Jardiance and Invokana. 2.  The importance of strict diet and exercise and ingestion of sufficient water was discussed with the patient. 3.  I will start the patient on ciprofloxacin 500 mg tablet twice a day for 7 days.  If there is no improvement in the patient will call me. 4.  Patient will have a repeat blood test in approximately 2 months time after which he will follow-up evaluation.  The plan was discussed in detail with the patient thank you

## 2024-08-16 ENCOUNTER — APPOINTMENT (OUTPATIENT)
Dept: ULTRASOUND IMAGING | Facility: CLINIC | Age: 84
End: 2024-08-16
Payer: MEDICARE

## 2024-08-16 PROCEDURE — 93971 EXTREMITY STUDY: CPT | Mod: RT

## 2024-11-22 ENCOUNTER — APPOINTMENT (OUTPATIENT)
Dept: ENDOCRINOLOGY | Facility: CLINIC | Age: 84
End: 2024-11-22

## 2025-03-26 ENCOUNTER — RX RENEWAL (OUTPATIENT)
Age: 85
End: 2025-03-26

## 2025-05-05 ENCOUNTER — RX RENEWAL (OUTPATIENT)
Age: 85
End: 2025-05-05

## 2025-05-13 ENCOUNTER — NON-APPOINTMENT (OUTPATIENT)
Age: 85
End: 2025-05-13

## 2025-05-14 ENCOUNTER — APPOINTMENT (OUTPATIENT)
Dept: UROLOGY | Facility: CLINIC | Age: 85
End: 2025-05-14
Payer: MEDICARE

## 2025-05-14 VITALS
BODY MASS INDEX: 35.21 KG/M2 | HEIGHT: 72 IN | DIASTOLIC BLOOD PRESSURE: 74 MMHG | SYSTOLIC BLOOD PRESSURE: 124 MMHG | HEART RATE: 76 BPM | WEIGHT: 260 LBS | TEMPERATURE: 97.7 F | OXYGEN SATURATION: 95 %

## 2025-05-14 DIAGNOSIS — N40.1 BENIGN PROSTATIC HYPERPLASIA WITH LOWER URINARY TRACT SYMPMS: ICD-10-CM

## 2025-05-14 DIAGNOSIS — N39.0 URINARY TRACT INFECTION, SITE NOT SPECIFIED: ICD-10-CM

## 2025-05-14 DIAGNOSIS — N13.8 BENIGN PROSTATIC HYPERPLASIA WITH LOWER URINARY TRACT SYMPMS: ICD-10-CM

## 2025-05-14 DIAGNOSIS — C67.9 MALIGNANT NEOPLASM OF BLADDER, UNSPECIFIED: ICD-10-CM

## 2025-05-14 PROCEDURE — 51701 INSERT BLADDER CATHETER: CPT

## 2025-05-14 PROCEDURE — 99215 OFFICE O/P EST HI 40 MIN: CPT | Mod: 25

## 2025-05-14 RX ORDER — TAMSULOSIN HYDROCHLORIDE 0.4 MG/1
0.4 CAPSULE ORAL
Qty: 30 | Refills: 1 | Status: ACTIVE | COMMUNITY
Start: 2025-05-14 | End: 1900-01-01

## 2025-05-14 RX ORDER — SULFAMETHOXAZOLE AND TRIMETHOPRIM 800; 160 MG/1; MG/1
800-160 TABLET ORAL TWICE DAILY
Qty: 14 | Refills: 0 | Status: ACTIVE | COMMUNITY
Start: 2025-05-14 | End: 1900-01-01

## 2025-05-14 RX ORDER — METHENAMINE HIPPURATE 1 G/1
1 TABLET ORAL
Qty: 90 | Refills: 0 | Status: ACTIVE | COMMUNITY
Start: 2025-05-14 | End: 1900-01-01

## 2025-05-19 LAB
BACTERIA UR CULT: NORMAL
URINE CYTOLOGY: NORMAL

## 2025-05-28 ENCOUNTER — RESULT REVIEW (OUTPATIENT)
Age: 85
End: 2025-05-28

## 2025-06-25 ENCOUNTER — APPOINTMENT (OUTPATIENT)
Dept: UROLOGY | Facility: CLINIC | Age: 85
End: 2025-06-25
Payer: MEDICARE

## 2025-06-25 VITALS
TEMPERATURE: 97.3 F | SYSTOLIC BLOOD PRESSURE: 109 MMHG | HEIGHT: 72 IN | WEIGHT: 260 LBS | BODY MASS INDEX: 35.21 KG/M2 | HEART RATE: 81 BPM | DIASTOLIC BLOOD PRESSURE: 56 MMHG

## 2025-06-25 PROCEDURE — 99214 OFFICE O/P EST MOD 30 MIN: CPT

## 2025-06-25 PROCEDURE — 51798 US URINE CAPACITY MEASURE: CPT

## 2025-06-30 LAB — BACTERIA UR CULT: NORMAL

## 2025-07-28 ENCOUNTER — RX RENEWAL (OUTPATIENT)
Age: 85
End: 2025-07-28

## 2025-08-11 ENCOUNTER — RX RENEWAL (OUTPATIENT)
Age: 85
End: 2025-08-11